# Patient Record
Sex: MALE | Race: WHITE | Employment: OTHER | URBAN - METROPOLITAN AREA
[De-identification: names, ages, dates, MRNs, and addresses within clinical notes are randomized per-mention and may not be internally consistent; named-entity substitution may affect disease eponyms.]

---

## 2017-01-16 ENCOUNTER — TELEPHONE (OUTPATIENT)
Dept: FAMILY MEDICINE | Facility: CLINIC | Age: 67
End: 2017-01-16

## 2017-02-24 ENCOUNTER — ANESTHESIA EVENT (OUTPATIENT)
Dept: GASTROENTEROLOGY | Facility: CLINIC | Age: 67
End: 2017-02-24
Payer: COMMERCIAL

## 2017-02-27 ENCOUNTER — ANESTHESIA (OUTPATIENT)
Dept: GASTROENTEROLOGY | Facility: CLINIC | Age: 67
End: 2017-02-27
Payer: COMMERCIAL

## 2017-02-27 PROCEDURE — 25000125 ZZHC RX 250: Performed by: NURSE ANESTHETIST, CERTIFIED REGISTERED

## 2017-02-27 PROCEDURE — 25000128 H RX IP 250 OP 636: Performed by: NURSE ANESTHETIST, CERTIFIED REGISTERED

## 2017-02-27 PROCEDURE — 25800025 ZZH RX 258: Performed by: NURSE ANESTHETIST, CERTIFIED REGISTERED

## 2017-02-27 RX ORDER — LIDOCAINE HYDROCHLORIDE 20 MG/ML
INJECTION, SOLUTION INFILTRATION; PERINEURAL PRN
Status: DISCONTINUED | OUTPATIENT
Start: 2017-02-27 | End: 2017-02-27

## 2017-02-27 RX ORDER — PROPOFOL 10 MG/ML
INJECTION, EMULSION INTRAVENOUS PRN
Status: DISCONTINUED | OUTPATIENT
Start: 2017-02-27 | End: 2017-02-27

## 2017-02-27 RX ORDER — EPHEDRINE SULFATE 50 MG/ML
INJECTION, SOLUTION INTRAMUSCULAR; INTRAVENOUS; SUBCUTANEOUS PRN
Status: DISCONTINUED | OUTPATIENT
Start: 2017-02-27 | End: 2017-02-27

## 2017-02-27 RX ORDER — PROPOFOL 10 MG/ML
INJECTION, EMULSION INTRAVENOUS CONTINUOUS PRN
Status: DISCONTINUED | OUTPATIENT
Start: 2017-02-27 | End: 2017-02-27

## 2017-02-27 RX ADMIN — MIDAZOLAM HYDROCHLORIDE 2 MG: 1 INJECTION, SOLUTION INTRAMUSCULAR; INTRAVENOUS at 10:57

## 2017-02-27 RX ADMIN — PROPOFOL 140 MCG/KG/MIN: 10 INJECTION, EMULSION INTRAVENOUS at 10:57

## 2017-02-27 RX ADMIN — LIDOCAINE HYDROCHLORIDE 20 MG: 20 INJECTION, SOLUTION INFILTRATION; PERINEURAL at 10:59

## 2017-02-27 RX ADMIN — PROPOFOL 30 MG: 10 INJECTION, EMULSION INTRAVENOUS at 11:02

## 2017-02-27 RX ADMIN — Medication 5 MG: at 11:09

## 2017-02-27 RX ADMIN — SODIUM CHLORIDE, POTASSIUM CHLORIDE, SODIUM LACTATE AND CALCIUM CHLORIDE: 600; 310; 30; 20 INJECTION, SOLUTION INTRAVENOUS at 10:57

## 2017-02-27 ASSESSMENT — COPD QUESTIONNAIRES: COPD: 1

## 2017-02-27 NOTE — ANESTHESIA POSTPROCEDURE EVALUATION
Patient: Terrence Montoya    Procedure(s):  colonoscopy with polypectomies by biopsy forceps and by snare - Wound Class: II-Clean Contaminated   - Wound Class: II-Clean Contaminated    Diagnosis:screening  Diagnosis Additional Information: No value filed.    Anesthesia Type:  MAC    Note:  Anesthesia Post Evaluation    Patient location during evaluation: Phase 2  Patient participation: Able to fully participate in evaluation  Level of consciousness: awake and alert  Pain management: adequate  Airway patency: patent  Cardiovascular status: acceptable  Respiratory status: acceptable  Hydration status: acceptable  PONV: none             Last vitals:  Vitals:    02/27/17 1137 02/27/17 1152 02/27/17 1205   BP: 103/68 100/64 127/84   Pulse:   82   Resp: 16 16 16   Temp:      SpO2: 97% 97% 97%         Electronically Signed By: REKHA Mccall CRNA  February 27, 2017  2:48 PM

## 2017-02-27 NOTE — ANESTHESIA POSTPROCEDURE EVALUATION
Patient: Terrence Montoya    Procedure(s):  colonoscopy with polypectomies by biopsy forceps and by snare - Wound Class: II-Clean Contaminated   - Wound Class: II-Clean Contaminated    Diagnosis:screening  Diagnosis Additional Information: No value filed.    Anesthesia Type:  MAC    Note:  Anesthesia Post Evaluation    Patient location during evaluation: Bedside  Patient participation: Able to fully participate in evaluation  Level of consciousness: awake  Pain management: adequate  Airway patency: patent  Cardiovascular status: acceptable  Respiratory status: acceptable  Hydration status: acceptable  PONV: none             Last vitals:  Vitals:    02/27/17 0956 02/27/17 1137 02/27/17 1152   BP: (!) 148/94 103/68 100/64   Resp: 16 16 16   Temp: 97.5  F (36.4  C)     SpO2: 96% 97% 97%         Electronically Signed By: REKHA Mccall CRNA  February 27, 2017  12:04 PM

## 2017-02-27 NOTE — ANESTHESIA CARE TRANSFER NOTE
Patient: Terrence Montoay    Procedure(s):  colonoscopy with polypectomies by biopsy forceps and by snare - Wound Class: II-Clean Contaminated   - Wound Class: II-Clean Contaminated    Diagnosis: screening  Diagnosis Additional Information: No value filed.    Anesthesia Type:   MAC     Note:  Airway :Face Mask  Patient transferred to:Phase II        Vitals: (Last set prior to Anesthesia Care Transfer)    CRNA VITALS  2/27/2017 1110 - 2/27/2017 1140      2/27/2017             Temp: (!)  69.1  F (20.6  C)                Electronically Signed By: REKHA Mccall CRNA  February 27, 2017  11:40 AM

## 2017-02-27 NOTE — ANESTHESIA POSTPROCEDURE EVALUATION
Patient: Terrence Montoya    Procedure(s):  colonoscopy with polypectomies by biopsy forceps and by snare - Wound Class: II-Clean Contaminated   - Wound Class: II-Clean Contaminated    Diagnosis:screening  Diagnosis Additional Information: No value filed.    Anesthesia Type:  MAC    Note:  Anesthesia Post Evaluation    Last vitals:  Vitals:    02/27/17 0956 02/27/17 1137 02/27/17 1152   BP: (!) 148/94 103/68 100/64   Resp: 16 16 16   Temp: 97.5  F (36.4  C)     SpO2: 96% 97% 97%         Electronically Signed By: REKHA Ramsey CRNA  February 27, 2017  12:08 PM

## 2017-02-27 NOTE — ANESTHESIA POSTPROCEDURE EVALUATION
Patient: Terrence Montoya    Procedure(s):  colonoscopy with polypectomies by biopsy forceps and by snare - Wound Class: II-Clean Contaminated   - Wound Class: II-Clean Contaminated    Diagnosis:screening  Diagnosis Additional Information: No value filed.    Anesthesia Type:  MAC    Note:  Anesthesia Post Evaluation    Patient location during evaluation: Phase 2  Patient participation: Able to fully participate in evaluation  Level of consciousness: awake and alert  Pain management: adequate  Airway patency: patent  Cardiovascular status: acceptable  Respiratory status: acceptable  Hydration status: acceptable  PONV: none             Last vitals:  Vitals:    02/27/17 0956 02/27/17 1137 02/27/17 1152   BP: (!) 148/94 103/68 100/64   Resp: 16 16 16   Temp: 97.5  F (36.4  C)     SpO2: 96% 97% 97%         Electronically Signed By: REKHA Mccall CRNA  February 27, 2017  12:05 PM

## 2017-02-27 NOTE — ANESTHESIA POSTPROCEDURE EVALUATION
Patient: Terrence Montoya    Procedure(s):  colonoscopy with polypectomies by biopsy forceps and by snare - Wound Class: II-Clean Contaminated   - Wound Class: II-Clean Contaminated    Diagnosis:screening  Diagnosis Additional Information: No value filed.    Anesthesia Type:  MAC    Note:  Anesthesia Post Evaluation    Patient location during evaluation: Phase 2  Patient participation: Able to fully participate in evaluation  Level of consciousness: awake and alert  Pain management: adequate  Airway patency: patent  Cardiovascular status: acceptable  Respiratory status: acceptable  Hydration status: acceptable  PONV: none             Last vitals:  Vitals:    02/27/17 1137 02/27/17 1152 02/27/17 1205   BP: 103/68 100/64 127/84   Pulse:   82   Resp: 16 16 16   Temp:      SpO2: 97% 97% 97%         Electronically Signed By: REKHA Mccall CRNA  February 27, 2017  12:46 PM

## 2017-02-27 NOTE — ANESTHESIA PREPROCEDURE EVALUATION
Anesthesia Evaluation     . Pt has had prior anesthetic. Type: General    No history of anesthetic complications     ROS/MED HX    ENT/Pulmonary:  - neg pulmonary ROS   (+)COPD, , . Other pulmonary disease (denies lunf problems) .    Neurologic:  - neg neurologic ROS     Cardiovascular:  - neg cardiovascular ROS   (+) ----. : . . . :. . No previous cardiac testing       METS/Exercise Tolerance:     Hematologic:  - neg hematologic  ROS       Musculoskeletal:  - neg musculoskeletal ROS       GI/Hepatic:  - neg GI/hepatic ROS       Renal/Genitourinary:  - ROS Renal section negative       Endo:  - neg endo ROS       Psychiatric:  - neg psychiatric ROS       Infectious Disease:  - neg infectious disease ROS       Malignancy:         Other:               Physical Exam  Normal systems: pulmonary and dental    Airway   Mallampati: I  TM distance: >3 FB  Neck ROM: full    Dental     Cardiovascular   Rhythm and rate: regular and normal      Pulmonary    breath sounds clear to auscultation                    Anesthesia Plan      History & Physical Review  History and physical reviewed and following examination; no interval change.    ASA Status:  2 .    NPO Status:  > 8 hours    Plan for MAC with Propofol induction. Maintenance will be TIVA.    PONV prophylaxis:  Ondansetron (or other 5HT-3) and Dexamethasone or Solumedrol       Postoperative Care  Postoperative pain management:  Oral pain medications.      Consents  Anesthetic plan, risks, benefits and alternatives discussed with:  Patient.  Use of blood products discussed: Yes.   Use of blood products discussed with Patient.  .                          .

## 2017-05-30 ENCOUNTER — OFFICE VISIT (OUTPATIENT)
Dept: URGENT CARE | Facility: RETAIL CLINIC | Age: 67
End: 2017-05-30
Payer: COMMERCIAL

## 2017-05-30 VITALS
HEART RATE: 79 BPM | OXYGEN SATURATION: 96 % | DIASTOLIC BLOOD PRESSURE: 94 MMHG | SYSTOLIC BLOOD PRESSURE: 143 MMHG | TEMPERATURE: 99.3 F

## 2017-05-30 DIAGNOSIS — J02.0 ACUTE STREPTOCOCCAL PHARYNGITIS: Primary | ICD-10-CM

## 2017-05-30 DIAGNOSIS — R05.9 COUGH: ICD-10-CM

## 2017-05-30 DIAGNOSIS — J02.9 ACUTE PHARYNGITIS, UNSPECIFIED ETIOLOGY: ICD-10-CM

## 2017-05-30 DIAGNOSIS — J20.9 ACUTE BRONCHITIS WITH COEXISTING CONDITION REQUIRING PROPHYLACTIC TREATMENT: ICD-10-CM

## 2017-05-30 LAB — S PYO AG THROAT QL IA.RAPID: ABNORMAL

## 2017-05-30 PROCEDURE — 99213 OFFICE O/P EST LOW 20 MIN: CPT | Performed by: PHYSICIAN ASSISTANT

## 2017-05-30 PROCEDURE — 87880 STREP A ASSAY W/OPTIC: CPT | Mod: QW | Performed by: PHYSICIAN ASSISTANT

## 2017-05-30 RX ORDER — ALBUTEROL SULFATE 90 UG/1
2 AEROSOL, METERED RESPIRATORY (INHALATION) EVERY 6 HOURS PRN
Qty: 1 INHALER | Refills: 0 | Status: SHIPPED | OUTPATIENT
Start: 2017-05-30 | End: 2018-12-14

## 2017-05-30 RX ORDER — AZITHROMYCIN 250 MG/1
TABLET, FILM COATED ORAL
Qty: 6 TABLET | Refills: 0 | Status: SHIPPED | OUTPATIENT
Start: 2017-05-30 | End: 2018-12-14

## 2017-05-30 NOTE — MR AVS SNAPSHOT
After Visit Summary   5/30/2017    Terrence Montoya    MRN: [id number]           Patient Information     Date Of Birth          1950        Visit Information        Provider Department      5/30/2017 10:40 AM Julienne Chavez PA-C [facility]        Today's Diagnoses     Acute pharyngitis, unspecified etiology    -  1    Cough        Elevated blood pressure        Acute streptococcal pharyngitis        Acute bronchitis with coexisting condition requiring prophylactic treatment          Care Instructions      Pharyngitis: Strep (Confirmed)     You have had a positive test for strep throat. Strep throat is a contagious illness. It is spread by coughing, kissing or by touching others after touching your mouth or nose. Symptoms include throat pain which is worse with swallowing, aching all over, headache and fever. It is treated with antibiotic medication. This should help you start to feel better within 1-2 days.  Home care    Rest at home. Drink plenty of fluids to avoid dehydration.    No work or school for the first 2 days of taking the antibiotics. After this time, you will not be contagious. You can then return to school or work if you are feeling better.     The antibiotic medication must be taken for the full 10 days, even if you feel better. This is very important to ensure the infection is treated. It is also important to prevent drug-resistent organisms from developing. If you were given an antibiotic shot, no more antibiotics are needed.    You may use acetaminophen (Tylenol) or ibuprofen (Motrin, Advil) to control pain or fever, unless another medicine was prescribed for this. (NOTE: If you have chronic liver or kidney disease or ever had a stomach ulcer or GI bleeding, talk with your doctor before using these medicines.)    Throat lozenges or sprays (such as Chloraseptic) help reduce pain. Gargling with warm salt water will also reduce throat pain. Dissolve 1/2  teaspoon of salt in 1 glass of warm water. This may be useful just before meals.     Soft foods are okay. Avoid salty or spicy foods.  Follow-up care  Follow up with your healthcare provider or our staff if you are not improving over the next week.  When to seek medical advice  Call your healthcare provider right away if any of these occur:    Fever as directed by your doctor     New or worsening ear pain, sinus pain, or headache    Painful lumps in the back of neck    Stiff neck    Lymph nodes are getting larger or becoming soft in the middle    Inability to swallow liquids, excessive drooling, or inability to open mouth wide due to throat pain    Signs of dehydration (very dark urine or no urine, sunken eyes, dizziness)    Trouble breathing or noisy breathing    Muffled voice    New rash    0640-5247 [employer]. [address] [place] [address]. All rights reserved. This information is not intended as a substitute for professional medical care. Always follow your healthcare professional's instructions.    ...................................    Your blood pressure is elevated at today's visit.  You should follow up with your primary provider regarding possible hypertension if your recheck are greater than 140/90.  Check your blood pressure several times in the next week or so. You can do this at local pharmacies, grocery stores or with the float nurse at the [facility]. Record your readings and take them with you to your appointment.  Goal BP <140/90  Do not take decongestants - they can raise your BP.  If you have chest pain, unusual headaches, vision changes or any sign or symptoms of stroke seek prompt medical attention.    BP (!) 143/94 (BP Location: Right arm, Patient Position: Chair, Cuff Size: Adult Large)  Pulse 79  Temp 99.3  F (37.4  C) (Tympanic)  SpO2 96%    Please FOLLOW UP at primary care clinic if not improving, new symptoms, worse or this does not resolve.  [place]  [facility]  [phone]                Follow-ups after your visit        Who to contact     You can reach your care team any time of the day by calling [phone].  Notification of test results:  If you have an abnormal lab result, we will notify you by phone as soon as possible.         Additional Information About Your Visit        MyChart Information     [employer] gives you secure access to your electronic health record. If you see a primary care provider, you can also send messages to your care team and make appointments. If you have questions, please call your primary care clinic.  If you do not have a primary care provider, please call [phone] and they will assist you.        Care EveryWhere ID     This is your Care EveryWhere ID. This could be used by other organizations to access your [facility] medical records  [id number]        Your Vitals Were     Pulse Temperature Pulse Oximetry             79 99.3  F (37.4  C) (Tympanic) 96%          Blood Pressure from Last 3 Encounters:   05/30/17 (!) 143/94   02/27/17 127/84   11/02/16 (!) 150/92    Weight from Last 3 Encounters:   11/02/16 184 lb 6.4 oz (83.6 kg)   05/16/16 189 lb (85.7 kg)   04/27/15 184 lb (83.5 kg)              We Performed the Following     RAPID STREP SCREEN          Today's Medication Changes          These changes are accurate as of: 5/30/17 11:13 AM.  If you have any questions, ask your nurse or doctor.               Start taking these medicines.        Dose/Directions    azithromycin 250 MG tablet   Commonly known as:  ZITHROMAX   Used for:  Acute bronchitis with coexisting condition requiring prophylactic treatment, Acute streptococcal pharyngitis   Started by:  Julienne Chavez PA-C        Two tablets first day, then one tablet daily for four days.   Quantity:  6 tablet   Refills:  0            Where to get your medicines      These medications were sent to [facility] [address] [place] [address] - [address]  [address],  [place] [address]     Phone:  [phone]     azithromycin 250 MG tablet                Primary Care Provider Office Phone # Fax #    Jem Casper,  [phone]       [address] [place] [address]        Thank you!     Thank you for choosing [facility]  for your care. Our goal is always to provide you with excellent care. Hearing back from our patients is one way we can continue to improve our services. Please take a few minutes to complete the written survey that you may receive in the mail after your visit with us. Thank you!             Your Updated Medication List - Protect others around you: Learn how to safely use, store and throw away your medicines at www.disposemymeds.org.          This list is accurate as of: 5/30/17 11:13 AM.  Always use your most recent med list.                   Brand Name Dispense Instructions for use    azithromycin 250 MG tablet    ZITHROMAX    6 tablet    Two tablets first day, then one tablet daily for four days.

## 2017-05-30 NOTE — NURSING NOTE
"Chief Complaint   Patient presents with     Pharyngitis     x 5 days     Cough      productive cough x 8 days        Initial BP (!) 143/94 (BP Location: Right arm, Patient Position: Chair, Cuff Size: Adult Large)  Pulse 79  Temp 99.3  F (37.4  C) (Tympanic)  SpO2 96% Estimated body mass index is 30.69 kg/(m^2) as calculated from the following:    Height as of 11/2/16: 5' 5\" (1.651 m).    Weight as of 11/2/16: 184 lb 6.4 oz (83.6 kg).  Medication Reconciliation: complete     Jessica Sundet      "

## 2017-05-30 NOTE — PATIENT INSTRUCTIONS
Pharyngitis: Strep (Confirmed)     You have had a positive test for strep throat. Strep throat is a contagious illness. It is spread by coughing, kissing or by touching others after touching your mouth or nose. Symptoms include throat pain which is worse with swallowing, aching all over, headache and fever. It is treated with antibiotic medication. This should help you start to feel better within 1-2 days.  Home care    Rest at home. Drink plenty of fluids to avoid dehydration.    No work or school for the first 2 days of taking the antibiotics. After this time, you will not be contagious. You can then return to school or work if you are feeling better.     The antibiotic medication must be taken for the full 10 days, even if you feel better. This is very important to ensure the infection is treated. It is also important to prevent drug-resistent organisms from developing. If you were given an antibiotic shot, no more antibiotics are needed.    You may use acetaminophen (Tylenol) or ibuprofen (Motrin, Advil) to control pain or fever, unless another medicine was prescribed for this. (NOTE: If you have chronic liver or kidney disease or ever had a stomach ulcer or GI bleeding, talk with your doctor before using these medicines.)    Throat lozenges or sprays (such as Chloraseptic) help reduce pain. Gargling with warm salt water will also reduce throat pain. Dissolve 1/2 teaspoon of salt in 1 glass of warm water. This may be useful just before meals.     Soft foods are okay. Avoid salty or spicy foods.  Follow-up care  Follow up with your healthcare provider or our staff if you are not improving over the next week.  When to seek medical advice  Call your healthcare provider right away if any of these occur:    Fever as directed by your doctor     New or worsening ear pain, sinus pain, or headache    Painful lumps in the back of neck    Stiff neck    Lymph nodes are getting larger or becoming soft in the  middle    Inability to swallow liquids, excessive drooling, or inability to open mouth wide due to throat pain    Signs of dehydration (very dark urine or no urine, sunken eyes, dizziness)    Trouble breathing or noisy breathing    Muffled voice    New rash    7038-8682 [employer]. [address] [place] [address]. All rights reserved. This information is not intended as a substitute for professional medical care. Always follow your healthcare professional's instructions.    ...................................    Your blood pressure is elevated at today's visit.  You should follow up with your primary provider regarding possible hypertension if your recheck are greater than 140/90.  Check your blood pressure several times in the next week or so. You can do this at local pharmacies, grocery stores or with the float nurse at the [facility]. Record your readings and take them with you to your appointment.  Goal BP <140/90  Do not take decongestants - they can raise your BP.  If you have chest pain, unusual headaches, vision changes or any sign or symptoms of stroke seek prompt medical attention.    BP (!) 143/94 (BP Location: Right arm, Patient Position: Chair, Cuff Size: Adult Large)  Pulse 79  Temp 99.3  F (37.4  C) (Tympanic)  SpO2 96%    Please FOLLOW UP at primary care clinic if not improving, new symptoms, worse or this does not resolve.  [facility]  [phone]

## 2017-05-30 NOTE — PROGRESS NOTES
"  Chief Complaint   Patient presents with     Pharyngitis     x 5 days     Cough      productive cough x 8 days          SUBJECTIVE:   Pt. presenting to [facility] -  with a chief complaint of nasal congestion, ST off and on and some cough -occ productive. No SOB or chest pain.  Hx of asthma or COPD..  Onset of symptoms gradual  Course of illness is worsening.    Severity moderate  Current and Associated symptoms: nasal congestion, cough  and sore throat  Treatment measures tried include Fluids, OTC meds and Rest.  Predisposing factors include None.  Last antibiotic 5/2016   Smoker no    Past Medical History:   Diagnosis Date     Brain tumor (benign) (H)      Emphysema     \"runners lung\"     Past Surgical History:   Procedure Laterality Date     COLONOSCOPY N/A 2/27/2017    Procedure: COMBINED COLONOSCOPY, SINGLE OR MULTIPLE BIOPSY/POLYPECTOMY BY BIOPSY;  Surgeon: Armani Jacques MD;  Location:  GI     Patient Active Problem List   Diagnosis     Elevated blood pressure     No current outpatient prescriptions on file.     No current facility-administered medications for this visit.      OBJECTIVE:  BP (!) 143/94 (BP Location: Right arm, Patient Position: Chair, Cuff Size: Adult Large)  Pulse 79  Temp 99.3  F (37.4  C) (Tympanic)  SpO2 96%    GENERAL APPEARANCE: cooperative, alert and no distress. Appears well hydrated.  EYES: conjunctiva clear  HENT: Rt ear canal  clear and TM normal   Lt ear canal clear and TM normal   Nose mod congestion. clear discharge  Mouth without ulcers or lesions. mod erythema. no exudate.   NECK: supple, few small shoddy NT ant nodes. No  posterior nodes.  RESP: lungs no rales but some scattered rhonchi and wheezes. Breathing easily.  CV: regular rates and rhythm  ABDOMEN:  soft, nontender, no HSM or masses and bowel sounds normal   SKIN: no suspicious lesions or rashes  no tenderness to palpate over  sinus areas.    Rapid strep pos    ASSESSMENT:     Acute " pharyngitis, unspecified etiology  Cough  Elevated blood pressure  Acute streptococcal pharyngitis  Acute bronchitis with coexisting condition requiring prophylactic treatment      PLAN:  Symptomatic measures   Prescriptions as below. Discussed indications, dosing, side affects and adverse reactions of medications with  Patient - Z pack (no history of arrhythmias)  And Albuterol  Eat yogurt daily or take a probiotic supplement when on antibiotics.  OTC cough suppressant/expectorant discussed  No decongestants - needs BP FU  Salt water gargles -throat lozenges or honey/lemon tea if soothing   saline nasal spray for  nasal congestion   Cool mist vaporizer.   Stay in clean air environment.  > rest.  > fluids.  Contagiousness and hygiene discussed.  Fever and pain  control measures discussed.   If unable to swallow or any breathing difficulty to go to ED   Declines letter for work  AVS given and discussed:  Patient Instructions     Pharyngitis: Strep (Confirmed)     You have had a positive test for strep throat. Strep throat is a contagious illness. It is spread by coughing, kissing or by touching others after touching your mouth or nose. Symptoms include throat pain which is worse with swallowing, aching all over, headache and fever. It is treated with antibiotic medication. This should help you start to feel better within 1-2 days.  Home care    Rest at home. Drink plenty of fluids to avoid dehydration.    No work or school for the first 2 days of taking the antibiotics. After this time, you will not be contagious. You can then return to school or work if you are feeling better.     The antibiotic medication must be taken for the full 10 days, even if you feel better. This is very important to ensure the infection is treated. It is also important to prevent drug-resistent organisms from developing. If you were given an antibiotic shot, no more antibiotics are needed.    You may use acetaminophen (Tylenol) or ibuprofen  (Motrin, Advil) to control pain or fever, unless another medicine was prescribed for this. (NOTE: If you have chronic liver or kidney disease or ever had a stomach ulcer or GI bleeding, talk with your doctor before using these medicines.)    Throat lozenges or sprays (such as Chloraseptic) help reduce pain. Gargling with warm salt water will also reduce throat pain. Dissolve 1/2 teaspoon of salt in 1 glass of warm water. This may be useful just before meals.     Soft foods are okay. Avoid salty or spicy foods.  Follow-up care  Follow up with your healthcare provider or our staff if you are not improving over the next week.  When to seek medical advice  Call your healthcare provider right away if any of these occur:    Fever as directed by your doctor     New or worsening ear pain, sinus pain, or headache    Painful lumps in the back of neck    Stiff neck    Lymph nodes are getting larger or becoming soft in the middle    Inability to swallow liquids, excessive drooling, or inability to open mouth wide due to throat pain    Signs of dehydration (very dark urine or no urine, sunken eyes, dizziness)    Trouble breathing or noisy breathing    Muffled voice    New rash    9863-6420 [employer]. [address] [place] [address]. All rights reserved. This information is not intended as a substitute for professional medical care. Always follow your healthcare professional's instructions.    ...................................    Your blood pressure is elevated at today's visit.  You should follow up with your primary provider regarding possible hypertension if your recheck are greater than 140/90.  Check your blood pressure several times in the next week or so. You can do this at local pharmacies, grocery stores or with the float nurse at the [facility]. Record your readings and take them with you to your appointment.  Goal BP <140/90  Do not take decongestants - they can raise your BP.  If you  have chest pain, unusual headaches, vision changes or any sign or symptoms of stroke seek prompt medical attention.    BP (!) 143/94 (BP Location: Right arm, Patient Position: Chair, Cuff Size: Adult Large)  Pulse 79  Temp 99.3  F (37.4  C) (Tympanic)  SpO2 96%    Please FOLLOW UP at primary care clinic if not improving, new symptoms, worse or this does not resolve.  [facility]  [phone]        Pt is comfortable with this plan.  Electronically signed,  MARTÍNEZ Chavez, PAC

## 2018-12-14 ENCOUNTER — OFFICE VISIT (OUTPATIENT)
Dept: FAMILY MEDICINE | Facility: OTHER | Age: 68
End: 2018-12-14
Payer: COMMERCIAL

## 2018-12-14 DIAGNOSIS — Z00.00 ROUTINE HISTORY AND PHYSICAL EXAMINATION OF ADULT: Primary | ICD-10-CM

## 2018-12-14 LAB
ALBUMIN SERPL-MCNC: 3.9 G/DL (ref 3.4–5)
ALP SERPL-CCNC: 92 U/L (ref 40–150)
ALT SERPL W P-5'-P-CCNC: 42 U/L (ref 0–70)
ANION GAP SERPL CALCULATED.3IONS-SCNC: 6 MMOL/L (ref 3–14)
AST SERPL W P-5'-P-CCNC: 16 U/L (ref 0–45)
BILIRUB SERPL-MCNC: 0.6 MG/DL (ref 0.2–1.3)
BUN SERPL-MCNC: 26 MG/DL (ref 7–30)
CALCIUM SERPL-MCNC: 8.7 MG/DL (ref 8.5–10.1)
CHLORIDE SERPL-SCNC: 109 MMOL/L (ref 94–109)
CO2 SERPL-SCNC: 25 MMOL/L (ref 20–32)
CREAT SERPL-MCNC: 1.26 MG/DL (ref 0.66–1.25)
ERYTHROCYTE [DISTWIDTH] IN BLOOD BY AUTOMATED COUNT: 13.7 % (ref 10–15)
GFR SERPL CREATININE-BSD FRML MDRD: 57 ML/MIN/1.7M2
GLUCOSE SERPL-MCNC: 175 MG/DL (ref 70–99)
HCT VFR BLD AUTO: 44.9 % (ref 40–53)
HGB BLD-MCNC: 14.8 G/DL (ref 13.3–17.7)
LDLC SERPL DIRECT ASSAY-MCNC: 136 MG/DL
MCH RBC QN AUTO: 30.1 PG (ref 26.5–33)
MCHC RBC AUTO-ENTMCNC: 33 G/DL (ref 31.5–36.5)
MCV RBC AUTO: 91 FL (ref 78–100)
PLATELET # BLD AUTO: 171 10E9/L (ref 150–450)
POTASSIUM SERPL-SCNC: 4.3 MMOL/L (ref 3.4–5.3)
PROT SERPL-MCNC: 7.6 G/DL (ref 6.8–8.8)
PSA SERPL-ACNC: 4.8 UG/L (ref 0–4)
RBC # BLD AUTO: 4.91 10E12/L (ref 4.4–5.9)
SODIUM SERPL-SCNC: 140 MMOL/L (ref 133–144)
WBC # BLD AUTO: 6 10E9/L (ref 4–11)

## 2018-12-14 PROCEDURE — 36415 COLL VENOUS BLD VENIPUNCTURE: CPT | Performed by: INTERNAL MEDICINE

## 2018-12-14 PROCEDURE — 80053 COMPREHEN METABOLIC PANEL: CPT | Performed by: INTERNAL MEDICINE

## 2018-12-14 PROCEDURE — 99397 PER PM REEVAL EST PAT 65+ YR: CPT | Performed by: INTERNAL MEDICINE

## 2018-12-14 PROCEDURE — 83721 ASSAY OF BLOOD LIPOPROTEIN: CPT | Performed by: INTERNAL MEDICINE

## 2018-12-14 PROCEDURE — G0103 PSA SCREENING: HCPCS | Performed by: INTERNAL MEDICINE

## 2018-12-14 PROCEDURE — 85027 COMPLETE CBC AUTOMATED: CPT | Performed by: INTERNAL MEDICINE

## 2018-12-14 ASSESSMENT — ENCOUNTER SYMPTOMS
PALPITATIONS: 0
NERVOUS/ANXIOUS: 0
CONSTIPATION: 0
NAUSEA: 0
FREQUENCY: 0
WEAKNESS: 0
DIARRHEA: 0
DIZZINESS: 0
DYSURIA: 0
ARTHRALGIAS: 0
COUGH: 1
SHORTNESS OF BREATH: 0
PARESTHESIAS: 0
ABDOMINAL PAIN: 0
SORE THROAT: 0
MYALGIAS: 0
CHILLS: 0
FEVER: 0
HEMATOCHEZIA: 0
EYE PAIN: 0
JOINT SWELLING: 0
HEARTBURN: 0
HEMATURIA: 0

## 2018-12-14 ASSESSMENT — ACTIVITIES OF DAILY LIVING (ADL): CURRENT_FUNCTION: NO ASSISTANCE NEEDED

## 2018-12-14 ASSESSMENT — PAIN SCALES - GENERAL: PAINLEVEL: NO PAIN (0)

## 2018-12-14 NOTE — PROGRESS NOTES
"SUBJECTIVE:   Terrence Montoya is a 68 year old male who presents for Preventive Visit.      Are you in the first 12 months of your Medicare coverage?   NOT USING MEDICARE INSURANCE, WANTS TO BE BILLED TO PRIMARY     Annual Wellness Visit     In general, how would you rate your overall health?  Good    Frequency of exercise:  1 day/week    Do you usually eat at least 4 servings of fruit and vegetables a day, include whole grains    & fiber and avoid regularly eating high fat or \"junk\" foods?  No    Taking medications regularly:  Yes    Medication side effects:  None    Ability to successfully perform activities of daily living:  No assistance needed    Home Safety:  Lack of grab bars in the bathroom and no safety concerns identified    Hearing Impairment:  Difficulty following a conversation in a noisy restaurant or crowded room and difficulty understanding soft or whispered speech    In the past 6 months, have you been bothered by leaking of urine?  No    In general, how would you rate your overall mental or emotional health?  Good    PHQ-2 Total Score: 0    Additional concerns today:  No    Do you feel safe in your environment? No    Do you have a Health Care Directive? No: Advance care planning reviewed with patient; information given to patient to review.      Fall risk  Fallen 2 or more times in the past year?: No  Any fall with injury in the past year?: No    Cognitive Screening   1) Repeat 3 items (Leader, Season, Table)    2) Clock draw: NORMAL  3) 3 item recall: Recalls 2 objects   Results: NORMAL clock, 1-2 items recalled: COGNITIVE IMPAIRMENT LESS LIKELY    Mini-CogTM Copyright CARLA Elias. Licensed by the author for use in [facility]; reprinted with permission (sirisha@.[facility]). All rights reserved.      Do you have sleep apnea, excessive snoring or daytime drowsiness?: no    Reviewed and updated as needed this visit by clinical staff  Tobacco  Allergies  Meds  Med Hx  Surg Hx  Fam Hx  Soc Hx "        Reviewed and updated as needed this visit by Provider        Social History     Tobacco Use     Smoking status: Never Smoker     Smokeless tobacco: Never Used   Substance Use Topics     Alcohol use: No     Alcohol/week: 0.0 oz       Alcohol Use 12/14/2018   If you drink alcohol do you typically have greater than 3 drinks per day OR greater than 7 drinks per week? No   No flowsheet data found.            Current providers sharing in care for this patient include:   Patient Care Team:  Jem Casper DO as PCP - General (Internal Medicine)    The following health maintenance items are reviewed in Epic and correct as of today:  Health Maintenance   Topic Date Due     ADVANCE DIRECTIVE PLANNING Q5 YRS  05/15/2007     ZOSTER IMMUNIZATION (2 of 3) 01/03/2017     FALL RISK ASSESSMENT  05/15/2017     AORTIC ANEURYSM SCREENING (SYSTEM ASSIGNED)  05/15/2017     PHQ-2 Q1 YR  11/02/2017     INFLUENZA VACCINE (1) 09/01/2018     LIPID SCREEN Q5 YR MALE (SYSTEM ASSIGNED)  11/02/2021     DTAP/TDAP/TD IMMUNIZATION (2 - Td) 11/30/2021     COLON CANCER SCREEN (SYSTEM ASSIGNED)  02/27/2027     HEPATITIS C SCREENING  Completed     IPV IMMUNIZATION  Aged Out     MENINGITIS IMMUNIZATION  Aged Out     Labs reviewed in EPIC  BP Readings from Last 3 Encounters:   12/14/18 (!) 181/96   05/30/17 (!) 143/94   02/27/17 127/84    Wt Readings from Last 3 Encounters:   12/14/18 87.3 kg (192 lb 8 oz)   11/02/16 83.6 kg (184 lb 6.4 oz)   05/16/16 85.7 kg (189 lb)                  Patient Active Problem List   Diagnosis     Elevated blood pressure     Past Surgical History:   Procedure Laterality Date     COLONOSCOPY N/A 2/27/2017    Procedure: COMBINED COLONOSCOPY, SINGLE OR MULTIPLE BIOPSY/POLYPECTOMY BY BIOPSY;  Surgeon: Armani Jacques MD;  Location:  GI       Social History     Tobacco Use     Smoking status: Never Smoker     Smokeless tobacco: Never Used   Substance Use Topics     Alcohol use: No     Alcohol/week:  "0.0 oz     Family History   Problem Relation Age of Onset     Other Cancer Mother         Lung     Other Cancer Father         Brain     Prostate Cancer Paternal Grandfather      Other Cancer Brother         Testicular     Anesthesia Reaction No family hx of          No current outpatient medications on file.     Allergies   Allergen Reactions     Nkda [No Known Drug Allergies]      Pneumonia Vaccine: Up-to-date    Review of Systems  CONSTITUTIONAL: NEGATIVE for fever, chills, change in weight  INTEGUMENTARY/SKIN: NEGATIVE for worrisome rashes, moles or lesions  EYES: NEGATIVE for vision changes or irritation  ENT/MOUTH: NEGATIVE for ear, mouth and throat problems  RESP: NEGATIVE for significant cough or SOB  BREAST: NEGATIVE for masses, tenderness or discharge  CV: NEGATIVE for chest pain, palpitations or peripheral edema  GI: NEGATIVE for nausea, abdominal pain, heartburn, or change in bowel habits  : NEGATIVE for frequency, dysuria, or hematuria  MUSCULOSKELETAL: NEGATIVE for significant arthralgias or myalgia  NEURO: NEGATIVE for weakness, dizziness or paresthesias  ENDOCRINE: NEGATIVE for temperature intolerance, skin/hair changes  HEME: NEGATIVE for bleeding problems  PSYCHIATRIC: NEGATIVE for changes in mood or affect    OBJECTIVE:   /88 (BP Location: Right arm, Patient Position: Sitting, Cuff Size: Adult Regular)   Pulse 72   Temp 97.8  F (36.6  C) (Temporal)   Resp 18   Wt 87.3 kg (192 lb 8 oz)   SpO2 97%   BMI 32.03 kg/m   Estimated body mass index is 32.03 kg/m  as calculated from the following:    Height as of 11/2/16: 1.651 m (5' 5\").    Weight as of this encounter: 87.3 kg (192 lb 8 oz).  Physical Exam  GENERAL: healthy, alert and no distress  EYES: Eyes grossly normal to inspection, PERRL and conjunctivae and sclerae normal  HENT: ear canals and TM's normal, nose and mouth without ulcers or lesions  NECK: no adenopathy, no asymmetry, masses, or scars and thyroid normal to " palpation  RESP: lungs clear to auscultation - no rales, rhonchi or wheezes  CV: regular rate and rhythm, normal S1 S2, no S3 or S4, no murmur, click or rub, no peripheral edema and peripheral pulses strong  ABDOMEN: soft, nontender, no hepatosplenomegaly, no masses and bowel sounds normal  MS: no gross musculoskeletal defects noted, no edema  SKIN: no suspicious lesions or rashes  NEURO: Normal strength and tone, mentation intact and speech normal  PSYCH: mentation appears normal, affect normal/bright    Diagnostic Test Results:  Results for orders placed or performed in visit on 12/14/18   CBC with platelets   Result Value Ref Range    WBC 6.0 4.0 - 11.0 10e9/L    RBC Count 4.91 4.4 - 5.9 10e12/L    Hemoglobin 14.8 13.3 - 17.7 g/dL    Hematocrit 44.9 40.0 - 53.0 %    MCV 91 78 - 100 fl    MCH 30.1 26.5 - 33.0 pg    MCHC 33.0 31.5 - 36.5 g/dL    RDW 13.7 10.0 - 15.0 %    Platelet Count 171 150 - 450 10e9/L   Comprehensive metabolic panel   Result Value Ref Range    Sodium 140 133 - 144 mmol/L    Potassium 4.3 3.4 - 5.3 mmol/L    Chloride 109 94 - 109 mmol/L    Carbon Dioxide 25 20 - 32 mmol/L    Anion Gap 6 3 - 14 mmol/L    Glucose 175 (H) 70 - 99 mg/dL    Urea Nitrogen 26 7 - 30 mg/dL    Creatinine 1.26 (H) 0.66 - 1.25 mg/dL    GFR Estimate 57 (L) >60 mL/min/1.7m2    GFR Estimate If Black 69 >60 mL/min/1.7m2    Calcium 8.7 8.5 - 10.1 mg/dL    Bilirubin Total 0.6 0.2 - 1.3 mg/dL    Albumin 3.9 3.4 - 5.0 g/dL    Protein Total 7.6 6.8 - 8.8 g/dL    Alkaline Phosphatase 92 40 - 150 U/L    ALT 42 0 - 70 U/L    AST 16 0 - 45 U/L   PSA, screen   Result Value Ref Range    PSA 4.80 (H) 0 - 4 ug/L   LDL cholesterol direct   Result Value Ref Range    LDL Cholesterol Direct 136 (H) <100 mg/dL       ASSESSMENT / PLAN:       ICD-10-CM    1. Routine history and physical examination of adult Z00.00 CBC with platelets     Comprehensive metabolic panel     PSA, screen     LDL cholesterol direct       End of Life Planning:  Patient  "currently has an advanced directive: Yes.  Practitioner is supportive of decision.    COUNSELING:  Reviewed preventive health counseling, as reflected in patient instructions       Regular exercise       Healthy diet/nutrition       Vision screening       Hearing screening       Dental care    BP Readings from Last 1 Encounters:   12/14/18 135/88     Estimated body mass index is 32.03 kg/m  as calculated from the following:    Height as of 11/2/16: 1.651 m (5' 5\").    Weight as of this encounter: 87.3 kg (192 lb 8 oz).    BP Screening:   Last 3 BP Readings:    BP Readings from Last 3 Encounters:   12/14/18 135/88   05/30/17 (!) 143/94   02/27/17 127/84       The following was recommended to the patient:  Re-screen BP within a year and recommended lifestyle modifications  Weight management plan: Discussed healthy diet and exercise guidelines     reports that  has never smoked. he has never used smokeless tobacco.      Appropriate preventive services were discussed with this patient, including applicable screening as appropriate for cardiovascular disease, diabetes, osteopenia/osteoporosis, and glaucoma.  As appropriate for age/gender, discussed screening for colorectal cancer, prostate cancer, breast cancer, and cervical cancer. Checklist reviewing preventive services available has been given to the patient.    Reviewed patients plan of care and provided an AVS. The Basic Care Plan (routine screening as documented in Health Maintenance) for Terrence meets the Care Plan requirement. This Care Plan has been established and reviewed with the Patient.    Counseling Resources:  ATP IV Guidelines  Pooled Cohorts Equation Calculator  Breast Cancer Risk Calculator  FRAX Risk Assessment  ICSI Preventive Guidelines  Dietary Guidelines for Americans, 2010  USDA's MyPlate  ASA Prophylaxis  Lung CA Screening    Jem Casper,   [facility]"

## 2018-12-14 NOTE — PATIENT INSTRUCTIONS
Preventive Health Recommendations:     See your health care provider every year to    Review health changes.     Discuss preventive care.      Review your medicines if your doctor has prescribed any.    Talk with your health care provider about whether you should have a test to screen for prostate cancer (PSA).    Every 3 years, have a diabetes test (fasting glucose). If you are at risk for diabetes, you should have this test more often.    Every 5 years, have a cholesterol test. Have this test more often if you are at risk for high cholesterol or heart disease.     Every 10 years, have a colonoscopy. Or, have a yearly FIT test (stool test). These exams will check for colon cancer.    Talk to with your health care provider about screening for Abdominal Aortic Aneurysm if you have a family history of AAA or have a history of smoking.  Shots:     Get a flu shot each year.     Get a tetanus shot every 10 years.     Talk to your doctor about your pneumonia vaccines. There are now two you should receive - Pneumovax (PPSV 23) and Prevnar (PCV 13).    Talk to your pharmacist about a shingles vaccine.     Talk to your doctor about the hepatitis B vaccine.  Nutrition:     Eat at least 5 servings of fruits and vegetables each day.     Eat whole-grain bread, whole-wheat pasta and brown rice instead of white grains and rice.     Get adequate Calcium and Vitamin D.   Lifestyle    Exercise for at least 150 minutes a week (30 minutes a day, 5 days a week). This will help you control your weight and prevent disease.     Limit alcohol to one drink per day.     No smoking.     Wear sunscreen to prevent skin cancer.     See your dentist every six months for an exam and cleaning.     See your eye doctor every 1 to 2 years to screen for conditions such as glaucoma, macular degeneration and cataracts.    Personalized Prevention Plan  You are due for the preventive services outlined below.  Your care team is available to assist you in  scheduling these services.  If you have already completed any of these items, please share that information with your care team to update in your medical record.    Health Maintenance Due   Topic Date Due     Discuss Advance Directive Planning  05/15/2007     Zoster (Chicken Pox) Vaccine (2 of 3) 01/03/2017     FALL RISK ASSESSMENT  05/15/2017     AORTIC ANEURYSM SCREENING (SYSTEM ASSIGNED)  05/15/2017     Depression Assessment 2 - yearly  11/02/2017     Flu Vaccine (1) 09/01/2018

## 2018-12-14 NOTE — PROGRESS NOTES
"  SUBJECTIVE:   Terrence Montoya is a 68 year old male who presents for Preventive Visit.  {PVP to remind patient that this is not necessarily a physical exam; physical exam may or may not be done:132594::\"click delete button to remove this line now\"}  {PVP to inform patient that additional E&M charge may apply, if additional problems addressed:991566::\"click delete button to remove this line now\"}  Are you in the first 12 months of your Medicare Part B coverage?  { :107942::\"No\"}    Physical Health:    In general, how would you rate your overall physical health? { :843350}    Outside of work, how many days during the week do you exercise? { :778346}    Outside of work, approximately how many minutes a day do you exercise?{ :829319}    If you drink alcohol do you typically have >3 drinks per day or >7 drinks per week? { :739416}    Do you usually eat at least 4 servings of fruit and vegetables a day, include whole grains & fiber and avoid regularly eating high fat or \"junk\" foods? { :080185::\"Yes\"}    Do you have any problems taking medications regularly?  { :520315::\"No\"}    Do you have any side effects from medications? { :962091}    Needs assistance for the following daily activities: { :823489}    Which of the following safety concerns are present in your home?  { :444831::\"none identified\"}     Hearing impairment: { :513028}    In the past 6 months, have you been bothered by leaking of urine? { :784413}    Mental Health:    In general, how would you rate your overall mental or emotional health? { :722161}  PHQ-2 Score: (P) 0    Do you feel safe in your environment? { :989020}    Do you have a Health Care Directive? { :769035}    Additional concerns to address?  {If YES, notify patient they may be responsible for a copay, coinsurance or deductible if the visit today includes services such as checking on a sore throat, having an x-ray or lab test, or treating and evaluating a new or existing condition " ":724407::\"No\"}    Fall risk:  { :215622}  {If any of the above assessments are answered yes, consider ordering appropriate referrals (Optional):385567::\"click delete button to remove this line now\"}  Cognitive Screening: { :818493}    {Do you have sleep apnea, excessive snoring or daytime drowsiness? (Optional):110108}    {Outside tests to abstract? :875601}    {additional problems to add (Optional):880640}    Reviewed and updated as needed this visit by clinical staff         Reviewed and updated as needed this visit by Provider        Social History     Tobacco Use     Smoking status: Never Smoker     Smokeless tobacco: Never Used   Substance Use Topics     Alcohol use: No     Alcohol/week: 0.0 oz                           Current providers sharing in care for this patient include:   Patient Care Team:  Jem Casper DO as PCP - General (Internal Medicine)    The following health maintenance items are reviewed in Epic and correct as of today:  Health Maintenance   Topic Date Due     ADVANCE DIRECTIVE PLANNING Q5 YRS  05/15/2007     ZOSTER IMMUNIZATION (2 of 3) 01/03/2017     FALL RISK ASSESSMENT  05/15/2017     AORTIC ANEURYSM SCREENING (SYSTEM ASSIGNED)  05/15/2017     PHQ-2 Q1 YR  11/02/2017     INFLUENZA VACCINE (1) 09/01/2018     LIPID SCREEN Q5 YR MALE (SYSTEM ASSIGNED)  11/02/2021     DTAP/TDAP/TD IMMUNIZATION (2 - Td) 11/30/2021     COLON CANCER SCREEN (SYSTEM ASSIGNED)  02/27/2027     HEPATITIS C SCREENING  Completed     IPV IMMUNIZATION  Aged Out     MENINGITIS IMMUNIZATION  Aged Out     {Chronicprobdata (Optional):362237}  {Decision Support (Optional):027432}    ROS:  {ROS COMP:056982}    OBJECTIVE:   There were no vitals taken for this visit. Estimated body mass index is 30.69 kg/m  as calculated from the following:    Height as of 11/2/16: 1.651 m (5' 5\").    Weight as of 11/2/16: 83.6 kg (184 lb 6.4 oz).  EXAM:   {Exam :112779}    {Diagnostic Test Results (Optional):134697::\"Diagnostic " "Test Results:\",\"none \"}    ASSESSMENT / PLAN:   {Diag Picklist:648263}    End of Life Planning:  Patient currently has an advanced directive: { :748273}    COUNSELING:  {Medicare Counselin}    BP Readings from Last 1 Encounters:   17 (!) 143/94     Estimated body mass index is 30.69 kg/m  as calculated from the following:    Height as of 16: 1.651 m (5' 5\").    Weight as of 16: 83.6 kg (184 lb 6.4 oz).    {BP Counseling- Complete if BP >= 120/80  (Optional):034214}  {Weight Management Plan (ACO) Complete if BMI is abnormal-  Ages 18-64  BMI >24.9.  Age 65+ with BMI <23 or >30 (Optional):259090}     reports that  has never smoked. he has never used smokeless tobacco.  {Tobacco Cessation -- Complete if patient is a smoker (Optional):346007}    Appropriate preventive services were discussed with this patient, including applicable screening as appropriate for cardiovascular disease, diabetes, osteopenia/osteoporosis, and glaucoma.  As appropriate for age/gender, discussed screening for colorectal cancer, prostate cancer, breast cancer, and cervical cancer. Checklist reviewing preventive services available has been given to the patient.    Reviewed patients plan of care and provided an AVS. The {CarePlan:893226} for Terrence meets the Care Plan requirement. This Care Plan has been established and reviewed with the {PATIENT, FAMILY MEMBER, CAREGIVER:015694}.    Counseling Resources:  ATP IV Guidelines  Pooled Cohorts Equation Calculator  Breast Cancer Risk Calculator  FRAX Risk Assessment  ICSI Preventive Guidelines  Dietary Guidelines for Americans,   Online Dealer's MyPlate  ASA Prophylaxis  Lung CA Screening    Jemlindsay Casper, DO  [facility]"

## 2018-12-20 NOTE — RESULT ENCOUNTER NOTE
Dear Terrence, your recent test results are attached.  The chemistry panel shows a blood sugar of 175 which is somewhat elevated.  Kidney function slightly decreased with a creatinine of 1.26.  Liver tests are normal.  Cholesterol slightly elevated with an LDL of 136.  The PSA has improved and is now down to 4.8.  The blood cell count is normal without evidence of anemia or leukemia.    Feel free to contact me via the office or My Chart if you have any questions regarding the above.  Sincerely,  Jem Casper,  FACOI

## 2019-01-23 ENCOUNTER — DOCUMENTATION ONLY (OUTPATIENT)
Dept: INTERNAL MEDICINE | Facility: CLINIC | Age: 69
End: 2019-01-23

## 2019-01-23 VITALS
DIASTOLIC BLOOD PRESSURE: 88 MMHG | SYSTOLIC BLOOD PRESSURE: 135 MMHG | BODY MASS INDEX: 32.03 KG/M2 | RESPIRATION RATE: 18 BRPM | TEMPERATURE: 97.8 F | OXYGEN SATURATION: 97 % | WEIGHT: 192.5 LBS | HEART RATE: 72 BPM

## 2019-01-23 NOTE — PROGRESS NOTES
Called and LM for patient to return call in regards to message below. Please relay message and help set up Diabetic and hypertension follow up with pcp.   Toshia Zamarripa MA

## 2019-01-23 NOTE — PROGRESS NOTES
Recent blood pressure and blood sugar were slightly elevated.  The patient was intended to come back for follow-up.  Could you please confirm that he has an appointment and if not, help him set this up?    Thank you.    Trem[place]ne

## 2019-01-24 ENCOUNTER — TELEPHONE (OUTPATIENT)
Dept: INTERNAL MEDICINE | Facility: CLINIC | Age: 69
End: 2019-01-24

## 2019-01-24 NOTE — TELEPHONE ENCOUNTER
Reason for Call:  Other call back    Detailed comments: Pt was unaware that he was to f/u in clinic. He stated that Dr. Casper told him not to worry about the hypertension for now as well. Also he isn't available til March 15th to be seen, he stated he works at 4am to 7pm and there's no way he can get in before 3/15. He did schedule for March 15th though, he will cancel the appt if you don't want to see him. Please call him and advise.     Phone Number Patient can be reached at: Cell number on file:    Telephone Information:   Mobile [phone]       Best Time: anytime    Can we leave a detailed message on this number? YES    Call taken on 1/24/2019 at 10:39 AM by Odilia Win

## 2019-03-15 ENCOUNTER — OFFICE VISIT (OUTPATIENT)
Dept: FAMILY MEDICINE | Facility: OTHER | Age: 69
End: 2019-03-15
Payer: COMMERCIAL

## 2019-03-15 VITALS
TEMPERATURE: 97.3 F | BODY MASS INDEX: 32.32 KG/M2 | SYSTOLIC BLOOD PRESSURE: 170 MMHG | RESPIRATION RATE: 16 BRPM | DIASTOLIC BLOOD PRESSURE: 90 MMHG | OXYGEN SATURATION: 97 % | WEIGHT: 194 LBS | HEART RATE: 78 BPM | HEIGHT: 65 IN

## 2019-03-15 DIAGNOSIS — Z23 NEED FOR VACCINATION: ICD-10-CM

## 2019-03-15 DIAGNOSIS — E78.5 HYPERLIPIDEMIA LDL GOAL <130: ICD-10-CM

## 2019-03-15 DIAGNOSIS — I10 ESSENTIAL HYPERTENSION: Primary | ICD-10-CM

## 2019-03-15 DIAGNOSIS — R35.1 BENIGN PROSTATIC HYPERPLASIA WITH NOCTURIA: ICD-10-CM

## 2019-03-15 DIAGNOSIS — R73.09 ELEVATED GLUCOSE: ICD-10-CM

## 2019-03-15 DIAGNOSIS — N40.1 BENIGN PROSTATIC HYPERPLASIA WITH NOCTURIA: ICD-10-CM

## 2019-03-15 LAB
ANION GAP SERPL CALCULATED.3IONS-SCNC: 8 MMOL/L (ref 3–14)
BUN SERPL-MCNC: 18 MG/DL (ref 7–30)
CALCIUM SERPL-MCNC: 8.6 MG/DL (ref 8.5–10.1)
CHLORIDE SERPL-SCNC: 110 MMOL/L (ref 94–109)
CHOLEST SERPL-MCNC: 189 MG/DL
CO2 SERPL-SCNC: 25 MMOL/L (ref 20–32)
CREAT SERPL-MCNC: 1.19 MG/DL (ref 0.66–1.25)
GFR SERPL CREATININE-BSD FRML MDRD: 63 ML/MIN/{1.73_M2}
GLUCOSE SERPL-MCNC: 176 MG/DL (ref 70–99)
HBA1C MFR BLD: 8.2 % (ref 0–5.6)
HDLC SERPL-MCNC: 40 MG/DL
LDLC SERPL CALC-MCNC: 131 MG/DL
NONHDLC SERPL-MCNC: 149 MG/DL
POTASSIUM SERPL-SCNC: 4.3 MMOL/L (ref 3.4–5.3)
SODIUM SERPL-SCNC: 143 MMOL/L (ref 133–144)
TRIGL SERPL-MCNC: 90 MG/DL

## 2019-03-15 PROCEDURE — 90732 PPSV23 VACC 2 YRS+ SUBQ/IM: CPT | Performed by: INTERNAL MEDICINE

## 2019-03-15 PROCEDURE — 80048 BASIC METABOLIC PNL TOTAL CA: CPT | Performed by: INTERNAL MEDICINE

## 2019-03-15 PROCEDURE — 36415 COLL VENOUS BLD VENIPUNCTURE: CPT | Performed by: INTERNAL MEDICINE

## 2019-03-15 PROCEDURE — 83036 HEMOGLOBIN GLYCOSYLATED A1C: CPT | Performed by: INTERNAL MEDICINE

## 2019-03-15 PROCEDURE — 80061 LIPID PANEL: CPT | Performed by: INTERNAL MEDICINE

## 2019-03-15 PROCEDURE — 90471 IMMUNIZATION ADMIN: CPT | Performed by: INTERNAL MEDICINE

## 2019-03-15 PROCEDURE — 99214 OFFICE O/P EST MOD 30 MIN: CPT | Mod: 25 | Performed by: INTERNAL MEDICINE

## 2019-03-15 RX ORDER — TAMSULOSIN HYDROCHLORIDE 0.4 MG/1
0.4 CAPSULE ORAL DAILY
Qty: 30 CAPSULE | Refills: 0 | Status: SHIPPED | OUTPATIENT
Start: 2019-03-15 | End: 2019-08-26

## 2019-03-15 RX ORDER — FINASTERIDE 5 MG/1
5 TABLET, FILM COATED ORAL DAILY
Qty: 30 TABLET | Refills: 0 | Status: SHIPPED | OUTPATIENT
Start: 2019-03-15 | End: 2019-08-26

## 2019-03-15 RX ORDER — LISINOPRIL 10 MG/1
10 TABLET ORAL DAILY
Qty: 30 TABLET | Refills: 0 | Status: SHIPPED | OUTPATIENT
Start: 2019-03-15 | End: 2019-04-17

## 2019-03-15 ASSESSMENT — PAIN SCALES - GENERAL: PAINLEVEL: NO PAIN (0)

## 2019-03-15 ASSESSMENT — MIFFLIN-ST. JEOR: SCORE: 1586.86

## 2019-03-15 NOTE — PROGRESS NOTES
SUBJECTIVE:   Terrence Montoya is a 68 year old male who presents to clinic today for the following health issues:      Diabetes Follow-up    Patient is checking blood sugars: once daily.  Results are as follows:         150s    Diabetic concerns: None     Symptoms of hypoglycemia (low blood sugar): none     Paresthesias (numbness or burning in feet) or sores: No     Date of last diabetic eye exam: 1 year ago, is scheduled for one in 2 weeks    BP Readings from Last 2 Encounters:   03/15/19 170/90   12/14/18 135/88     LDL Cholesterol Calculated (mg/dL)   Date Value   11/02/2016 150 (H)     LDL Cholesterol Direct (mg/dL)   Date Value   12/14/2018 136 (H)       Diabetes Management Resources  Hypertension Follow-up      Outpatient blood pressures are being checked at home.  Results are high.    Low Salt Diet: no added salt      Amount of exercise or physical activity: 1hr per day    Problems taking medications regularly: No    Medication side effects: none    Diet: regular (no restrictions)               Chief Complaint    The patient is a pleasant 68-year-old gentleman who presents today for follow-up of his elevated blood pressure and blood sugar.  His glucose has been slightly elevated in the past and this is being followed.  Additionally his blood pressure has been in the 150 systolic range on many occasions.  He denies any chest pain or shortness of breath.  He has otherwise been feeling well.  He does note that he is developing some prostate symptoms.  He has nocturia, frequency and some hesitancy.  His PSA levels have been slightly elevated in the past but are actually coming down.                       PAST, FAMILY,SOCIAL HISTORY:     Medical  History:   has a past medical history of Brain tumor (benign) (H) and Emphysema.     Surgical History:   has a past surgical history that includes Colonoscopy (N/A, 2/27/2017).     Social History:   reports that  has never smoked. he has never used  "smokeless tobacco. He reports that he does not drink alcohol or use drugs.     Family History:  family history includes Other Cancer in his brother, father, and mother; Prostate Cancer in his paternal grandfather.            MEDICATIONS  Current Outpatient Medications   Medication Sig Dispense Refill     finasteride (PROSCAR) 5 MG tablet Take 1 tablet (5 mg) by mouth daily 30 tablet 0     lisinopril (PRINIVIL/ZESTRIL) 10 MG tablet Take 1 tablet (10 mg) by mouth daily 30 tablet 0     tamsulosin (FLOMAX) 0.4 MG capsule Take 1 capsule (0.4 mg) by mouth daily 30 capsule 0         --------------------------------------------------------------------------------------------------------------------                              Review of Systems       LUNGS: Pt denies: cough, excess sputum, hemoptysis, or shortness of breath.   HEART: Pt denies: chest pain, arrhythmia, syncope, tachy or bradyarrhythmia.   GI: Pt denies: nausea, vomiting, diarrhea, constipation, melena, or hematochezia.   NEURO: Pt denies: seizures, strokes, diplopia, weakness, paraesthesias, or paralysis.   SKIN: Pt denies: itching, rashes, discoloration, or specific lesions of concern. Denies recent hair loss.   PSYCH: The patient denies significant depression, anxiety, mood imbalance. Specifically denies any suicidal ideation.                                     Examination      /90 (BP Location: Left arm, Patient Position: Sitting, Cuff Size: Adult Regular)   Pulse 78   Temp 97.3  F (36.3  C) (Temporal)   Resp 16   Ht 1.651 m (5' 5\")   Wt 88 kg (194 lb)   SpO2 97%   BMI 32.28 kg/m     Constitutional: The patient appears to be in no acute distress. The patient appears to be adequately hydrated. No acute respiratory or hemodynamic distress is noted at this time.   LUNGS: clear bilaterally, airflow is brisk, no intercostal retraction or stridor is noted. No coughing is noted during visit.   HEART:  regular without rubs, clicks, gallops, or " "murmurs. PMI is nondisplaced. Upstrokes are brisk. S1,S2 are heard.   GI: Abdomen is soft, without rebound, guarding or tenderness. Bowel sounds are appropriate. No renal bruits are heard.   NEURO: Pt is alert and appropriate. No neurologic lateralization is noted. Cranial nerves 2-12 are intact. Peripheral sensory and motor function are grossly normal.    SKIN:  warm and dry. No erythema, or rashes are noted. No specific lesions of concern are noted.    PSYCH: The patient appears grossly appropriate. Maintains good eye contact, does not have any jittery or atypical motion. Displays appropriate affect.                                           Decision Making    1. Essential hypertension  Recommend starting lisinopril 10 mg daily.  Patient aware of potential side effects.  - lisinopril (PRINIVIL/ZESTRIL) 10 MG tablet; Take 1 tablet (10 mg) by mouth daily  Dispense: 30 tablet; Refill: 0  - Basic metabolic panel    2. Elevated glucose  Check A1c and considered initiating medication predicated upon those results.  Patient will continue low carbohydrate low sugar diet.  - Hemoglobin A1c    3. Hyperlipidemia LDL goal <130  Check lipids and adjust medications accordingly.  May require the initiation of statin especially if blood sugars come back elevated patient received diagnosis of \"diabetes\"  - Lipid Profile    4. Benign prostatic hyperplasia with nocturia  Treat appropriately as noted.  - tamsulosin (FLOMAX) 0.4 MG capsule; Take 1 capsule (0.4 mg) by mouth daily  Dispense: 30 capsule; Refill: 0  - finasteride (PROSCAR) 5 MG tablet; Take 1 tablet (5 mg) by mouth daily  Dispense: 30 tablet; Refill: 0                           FOLLOW UP   I have asked the patient to make an appointment for followup with me in 1 month        I have carefully explained the diagnosis and treatment options to the patient.  The patient has displayed an understanding of the above, and all subsequent questions were answered.      Jem " DO LEONARDO Casper    Portions of this note were produced using [employer]  Although every attempt at real-time proof reading has been made, occasional grammar/syntax errors may have been missed.

## 2019-03-17 NOTE — RESULT ENCOUNTER NOTE
Dear Terrence, your recent test results are attached.  The chemistry panel shows an elevated blood sugar 176 and normal kidney function.  Cholesterol is somewhat elevated with an LDL of 131  Hemoglobin A1c is elevated at 8.2 suggesting the need for improved blood sugar management.  An appointment should be set up to discuss improved blood sugar management.    Feel free to contact me via the office or My Chart if you have any questions regarding the above.  Sincerely,  Jem Casper DO FACOI

## 2019-04-17 ENCOUNTER — MYC REFILL (OUTPATIENT)
Dept: FAMILY MEDICINE | Facility: OTHER | Age: 69
End: 2019-04-17

## 2019-04-17 DIAGNOSIS — I10 ESSENTIAL HYPERTENSION: ICD-10-CM

## 2019-04-19 RX ORDER — LISINOPRIL 10 MG/1
10 TABLET ORAL DAILY
Qty: 30 TABLET | Refills: 0 | Status: SHIPPED | OUTPATIENT
Start: 2019-04-19 | End: 2019-05-22

## 2019-04-19 NOTE — TELEPHONE ENCOUNTER
Routing refill request to provider for review/approval because:  Labs out of range:  BP    BRIDGET RamirezN, RN  [facility]

## 2019-04-19 NOTE — TELEPHONE ENCOUNTER
"Requested Prescriptions   Pending Prescriptions Disp Refills     lisinopril (PRINIVIL/ZESTRIL) 10 MG tablet 30 tablet 0     Sig: Take 1 tablet (10 mg) by mouth daily   Last Written Prescription Date:  3/15/19  Last Fill Quantity: 30,  # refills: 0   Last office visit: 3/15/2019 with prescribing provider:  3/15/19   Future Office Visit:        ACE Inhibitors (Including Combos) Protocol Failed - 4/18/2019  6:58 AM        Failed - Blood pressure under 140/90 in past 12 months     BP Readings from Last 3 Encounters:   03/15/19 170/90   12/14/18 135/88   05/30/17 (!) 143/94                 Passed - Recent (12 mo) or future (30 days) visit within the authorizing provider's specialty     Patient had office visit in the last 12 months or has a visit in the next 30 days with authorizing provider or within the authorizing provider's specialty.  See \"Patient Info\" tab in inbasket, or \"Choose Columns\" in Meds & Orders section of the refill encounter.              Passed - Medication is active on med list        Passed - Patient is age 18 or older        Passed - Normal serum creatinine on file in past 12 months     Recent Labs   Lab Test 03/15/19  0744   CR 1.19             Passed - Normal serum potassium on file in past 12 months     Recent Labs   Lab Test 03/15/19  0744   POTASSIUM 4.3             "

## 2019-05-17 ENCOUNTER — TRANSFERRED RECORDS (OUTPATIENT)
Dept: HEALTH INFORMATION MANAGEMENT | Facility: CLINIC | Age: 69
End: 2019-05-17

## 2019-05-22 ENCOUNTER — MYC REFILL (OUTPATIENT)
Dept: FAMILY MEDICINE | Facility: OTHER | Age: 69
End: 2019-05-22

## 2019-05-22 DIAGNOSIS — I10 ESSENTIAL HYPERTENSION: ICD-10-CM

## 2019-05-23 RX ORDER — LISINOPRIL 10 MG/1
10 TABLET ORAL DAILY
Qty: 30 TABLET | Refills: 0 | Status: SHIPPED | OUTPATIENT
Start: 2019-05-23 | End: 2019-07-02

## 2019-05-23 NOTE — TELEPHONE ENCOUNTER
"lisinopril  Last Written Prescription Date:  4/19/2019  Last Fill Quantity: 30,  # refills: 0   Last office visit: 3/15/2019 with prescribing provider:  Tremayne   Future Office Visit:      Requested Prescriptions   Pending Prescriptions Disp Refills     lisinopril (PRINIVIL/ZESTRIL) 10 MG tablet 30 tablet 0     Sig: Take 1 tablet (10 mg) by mouth daily       ACE Inhibitors (Including Combos) Protocol Failed - 5/23/2019  8:01 AM        Failed - Blood pressure under 140/90 in past 12 months     BP Readings from Last 3 Encounters:   03/15/19 170/90   12/14/18 135/88   05/30/17 (!) 143/94                 Passed - Recent (12 mo) or future (30 days) visit within the authorizing provider's specialty     Patient had office visit in the last 12 months or has a visit in the next 30 days with authorizing provider or within the authorizing provider's specialty.  See \"Patient Info\" tab in inbasket, or \"Choose Columns\" in Meds & Orders section of the refill encounter.              Passed - Medication is active on med list        Passed - Patient is age 18 or older        Passed - Normal serum creatinine on file in past 12 months     Recent Labs   Lab Test 03/15/19  0744   CR 1.19             Passed - Normal serum potassium on file in past 12 months     Recent Labs   Lab Test 03/15/19  0744   POTASSIUM 4.3             Routing refill request to provider for review/approval because:  Monisha given x1 and patient did not follow up, please advise    Isabel Stubbs RN on 5/23/2019 at 1:22 PM    "

## 2019-06-18 ENCOUNTER — TRANSFERRED RECORDS (OUTPATIENT)
Dept: HEALTH INFORMATION MANAGEMENT | Facility: CLINIC | Age: 69
End: 2019-06-18

## 2019-06-19 ENCOUNTER — TRANSFERRED RECORDS (OUTPATIENT)
Dept: HEALTH INFORMATION MANAGEMENT | Facility: CLINIC | Age: 69
End: 2019-06-19

## 2019-07-02 ENCOUNTER — MYC REFILL (OUTPATIENT)
Dept: FAMILY MEDICINE | Facility: OTHER | Age: 69
End: 2019-07-02

## 2019-07-02 DIAGNOSIS — I10 ESSENTIAL HYPERTENSION: ICD-10-CM

## 2019-07-02 RX ORDER — LISINOPRIL 10 MG/1
10 TABLET ORAL DAILY
Qty: 30 TABLET | Refills: 1 | Status: SHIPPED | OUTPATIENT
Start: 2019-07-02 | End: 2019-09-09

## 2019-07-02 NOTE — TELEPHONE ENCOUNTER
"Requested Prescriptions   Pending Prescriptions Disp Refills     lisinopril (PRINIVIL/ZESTRIL) 10 MG tablet 30 tablet 0     Sig: Take 1 tablet (10 mg) by mouth daily   Last Written Prescription Date:  5/23/19  Last Fill Quantity: 30,  # refills: 0   Last office visit: 3/15/2019 with prescribing provider:  3/15/19   Future Office Visit:        ACE Inhibitors (Including Combos) Protocol Failed - 7/2/2019  8:09 AM        Failed - Blood pressure under 140/90 in past 12 months     BP Readings from Last 3 Encounters:   03/15/19 170/90   12/14/18 135/88   05/30/17 (!) 143/94                 Passed - Recent (12 mo) or future (30 days) visit within the authorizing provider's specialty     Patient had office visit in the last 12 months or has a visit in the next 30 days with authorizing provider or within the authorizing provider's specialty.  See \"Patient Info\" tab in inbasket, or \"Choose Columns\" in Meds & Orders section of the refill encounter.              Passed - Medication is active on med list        Passed - Patient is age 18 or older        Passed - Normal serum creatinine on file in past 12 months     Recent Labs   Lab Test 03/15/19  0744   CR 1.19             Passed - Normal serum potassium on file in past 12 months     Recent Labs   Lab Test 03/15/19  0744   POTASSIUM 4.3             "

## 2019-07-02 NOTE — TELEPHONE ENCOUNTER
Routing refill request to provider for review/approval because:  Labs out of range:  BP    BRIDGET RamirezN, RN  [facility]

## 2019-08-26 ENCOUNTER — OFFICE VISIT (OUTPATIENT)
Dept: URGENT CARE | Facility: RETAIL CLINIC | Age: 69
End: 2019-08-26
Payer: COMMERCIAL

## 2019-08-26 VITALS
TEMPERATURE: 98.3 F | OXYGEN SATURATION: 97 % | HEART RATE: 75 BPM | DIASTOLIC BLOOD PRESSURE: 93 MMHG | SYSTOLIC BLOOD PRESSURE: 159 MMHG

## 2019-08-26 DIAGNOSIS — J40 BRONCHITIS: Primary | ICD-10-CM

## 2019-08-26 PROCEDURE — 99213 OFFICE O/P EST LOW 20 MIN: CPT | Performed by: FAMILY MEDICINE

## 2019-08-26 RX ORDER — AZITHROMYCIN 250 MG/1
TABLET, FILM COATED ORAL
Qty: 6 TABLET | Refills: 0 | Status: SHIPPED | OUTPATIENT
Start: 2019-08-26 | End: 2020-11-06

## 2019-08-26 NOTE — PROGRESS NOTES
"SUBJECTIVE:  Terrence Montoya is a 69 year old male who presents to the clinic today with a chief complaint of cough  for 2 week(s).  His cough is described as persistent and productive of yellow sputum.    The patient's symptoms are moderate and worsening.  Associated symptoms include none. The patient's symptoms are exacerbated by no particular triggers  Patient has been using OTC cough suppressants  to improve symptoms.    Past Medical History:   Diagnosis Date     Brain tumor (benign) (H)      Emphysema     \"runners lung\"     Current Outpatient Medications   Medication Sig Dispense Refill     azithromycin (ZITHROMAX) 250 MG tablet Two tablets first day, then one tablet daily for four days. 6 tablet 0     lisinopril (PRINIVIL/ZESTRIL) 10 MG tablet Take 1 tablet (10 mg) by mouth daily 30 tablet 1     History   Smoking Status     Never Smoker   Smokeless Tobacco     Never Used       ROS  Review of systems negative except as stated above.    OBJECTIVE:  BP (!) 159/93   Pulse 75   Temp 98.3  F (36.8  C) (Oral)   SpO2 97%   GENERAL APPEARANCE: healthy, alert and no distress  EYES: EOMI,  PERRL, conjunctiva clear  HENT: ear canals and TM's normal.  Nose and mouth without ulcers, erythema or lesions  NECK: supple, nontender, no lymphadenopathy  RESP: rhonchi L lower posterior  CV: regular rates and rhythm, normal S1 S2, no murmur noted  ABDOMEN:  soft, nontender, no HSM or masses and bowel sounds normal  NEURO: Normal strength and tone, sensory exam grossly normal,  normal speech and mentation  SKIN: no suspicious lesions or rashes    ASSESSMENT:    Acute Bronchitis    PLAN:  Zithromax, fluids, rest.  Symptomatic measures encouraged, humidified air, plenty of fluids.  Follow up with primary care provider if no improvement.  "

## 2019-09-09 ENCOUNTER — MYC REFILL (OUTPATIENT)
Dept: FAMILY MEDICINE | Facility: OTHER | Age: 69
End: 2019-09-09

## 2019-09-09 DIAGNOSIS — I10 ESSENTIAL HYPERTENSION: ICD-10-CM

## 2019-09-10 ENCOUNTER — TRANSFERRED RECORDS (OUTPATIENT)
Dept: HEALTH INFORMATION MANAGEMENT | Facility: CLINIC | Age: 69
End: 2019-09-10

## 2019-09-10 RX ORDER — LISINOPRIL 10 MG/1
10 TABLET ORAL DAILY
Qty: 30 TABLET | Refills: 1 | Status: SHIPPED | OUTPATIENT
Start: 2019-09-10 | End: 2019-11-19

## 2019-09-10 NOTE — TELEPHONE ENCOUNTER
"Lisinopril  Last Written Prescription Date:  07/02/2019  Last Fill Quantity: 30,  # refills: 1   Last office visit: 3/15/2019 with prescribing provider:  jessica   Future Office Visit:  None  Routing refill request to provider for review/approval because:  Blood pressures elevated above goal.     Requested Prescriptions   Pending Prescriptions Disp Refills     lisinopril (PRINIVIL/ZESTRIL) 10 MG tablet 30 tablet 1     Sig: Take 1 tablet (10 mg) by mouth daily       ACE Inhibitors (Including Combos) Protocol Failed - 9/10/2019  8:49 AM        Failed - Blood pressure under 140/90 in past 12 months     BP Readings from Last 3 Encounters:   08/26/19 (!) 159/93   03/15/19 170/90   12/14/18 135/88           Passed - Recent (12 mo) or future (30 days) visit within the authorizing provider's specialty     Patient had office visit in the last 12 months or has a visit in the next 30 days with authorizing provider or within the authorizing provider's specialty.  See \"Patient Info\" tab in inbasket, or \"Choose Columns\" in Meds & Orders section of the refill encounter.          Passed - Medication is active on med list        Passed - Patient is age 18 or older        Passed - Normal serum creatinine on file in past 12 months     Recent Labs   Lab Test 03/15/19  0744   CR 1.19           Passed - Normal serum potassium on file in past 12 months     Recent Labs   Lab Test 03/15/19  0744   POTASSIUM 4.3         Jaylyn Boudreaux RN   "

## 2019-09-28 ENCOUNTER — HEALTH MAINTENANCE LETTER (OUTPATIENT)
Age: 69
End: 2019-09-28

## 2019-11-19 ENCOUNTER — MYC REFILL (OUTPATIENT)
Dept: FAMILY MEDICINE | Facility: OTHER | Age: 69
End: 2019-11-19

## 2019-11-19 DIAGNOSIS — I10 ESSENTIAL HYPERTENSION: ICD-10-CM

## 2019-11-20 RX ORDER — LISINOPRIL 10 MG/1
10 TABLET ORAL DAILY
Qty: 30 TABLET | Refills: 0 | Status: SHIPPED | OUTPATIENT
Start: 2019-11-20 | End: 2019-12-09

## 2019-11-20 NOTE — TELEPHONE ENCOUNTER
"Routing refill request to provider for review/approval because:  BP out of range  T'd up 1 month for provider review.    Requested Prescriptions   Pending Prescriptions Disp Refills     lisinopril (PRINIVIL/ZESTRIL) 10 MG tablet 30 tablet 1     Sig: Take 1 tablet (10 mg) by mouth daily   Last Written Prescription Date:  9/10/2019  Last Fill Quantity: 30,  # refills: 1   Last office visit: 3/15/2019 with prescribing provider:     Future Office Visit:        ACE Inhibitors (Including Combos) Protocol Failed - 11/20/2019  8:56 AM        Failed - Blood pressure under 140/90 in past 12 months     BP Readings from Last 3 Encounters:   08/26/19 (!) 159/93   03/15/19 170/90   12/14/18 135/88           Passed - Recent (12 mo) or future (30 days) visit within the authorizing provider's specialty     Patient has had an office visit with the authorizing provider or a provider within the authorizing providers department within the previous 12 mos or has a future within next 30 days. See \"Patient Info\" tab in inbasket, or \"Choose Columns\" in Meds & Orders section of the refill encounter.            Passed - Medication is active on med list        Passed - Patient is age 18 or older        Passed - Normal serum creatinine on file in past 12 months     Recent Labs   Lab Test 03/15/19  0744   CR 1.19           Passed - Normal serum potassium on file in past 12 months     Recent Labs   Lab Test 03/15/19  0744   POTASSIUM 4.3         Heide Almanza RN      "

## 2019-12-09 ENCOUNTER — MYC REFILL (OUTPATIENT)
Dept: FAMILY MEDICINE | Facility: OTHER | Age: 69
End: 2019-12-09

## 2019-12-09 DIAGNOSIS — I10 ESSENTIAL HYPERTENSION: ICD-10-CM

## 2019-12-10 RX ORDER — LISINOPRIL 10 MG/1
10 TABLET ORAL DAILY
Qty: 30 TABLET | Refills: 0 | Status: SHIPPED | OUTPATIENT
Start: 2019-12-10 | End: 2020-02-24

## 2019-12-10 NOTE — TELEPHONE ENCOUNTER
lisinopril (PRINIVIL/ZESTRIL) 10 MG tablet [Jem Casper, ]      Patient Comment: I am leaving for [place] on 12- and will be gone until 1- and don't have enough pills to last until I return. I had an appointment scheduled for 12- but had to cancel it because insurance wouldn't cover it.    Preferred pharmacy: [address] - [facility] [address]    Patient message from [place].     Toshia Zamarripa MA

## 2020-02-24 ENCOUNTER — MYC REFILL (OUTPATIENT)
Dept: FAMILY MEDICINE | Facility: OTHER | Age: 70
End: 2020-02-24

## 2020-02-24 DIAGNOSIS — I10 ESSENTIAL HYPERTENSION: ICD-10-CM

## 2020-02-25 RX ORDER — LISINOPRIL 10 MG/1
10 TABLET ORAL DAILY
Qty: 30 TABLET | Refills: 0 | Status: SHIPPED | OUTPATIENT
Start: 2020-02-25 | End: 2020-03-31

## 2020-02-25 NOTE — TELEPHONE ENCOUNTER
Lisinopril  Routing refill request to provider for review/approval because:  Blood pressures elevated above goal.     Jaylyn Boudreaux RN

## 2020-02-25 NOTE — TELEPHONE ENCOUNTER
"Requested Prescriptions   Pending Prescriptions Disp Refills     lisinopril (ZESTRIL) 10 MG tablet 30 tablet 0     Sig: Take 1 tablet (10 mg) by mouth daily   Last Written Prescription Date:  12/10/2019  Last Fill Quantity: 30,  # refills: 0   Last office visit: 3/15/2019 with prescribing provider:  03/15/2019   Future Office Visit:        ACE Inhibitors (Including Combos) Protocol Failed - 2/24/2020  5:53 PM        Failed - Blood pressure under 140/90 in past 12 months     BP Readings from Last 3 Encounters:   08/26/19 (!) 159/93   03/15/19 170/90   12/14/18 135/88                 Passed - Recent (12 mo) or future (30 days) visit within the authorizing provider's specialty     Patient has had an office visit with the authorizing provider or a provider within the authorizing providers department within the previous 12 mos or has a future within next 30 days. See \"Patient Info\" tab in inbasket, or \"Choose Columns\" in Meds & Orders section of the refill encounter.              Passed - Medication is active on med list        Passed - Patient is age 18 or older        Passed - Normal serum creatinine on file in past 12 months     Recent Labs   Lab Test 03/15/19  0744   CR 1.19             Passed - Normal serum potassium on file in past 12 months     Recent Labs   Lab Test 03/15/19  0744   POTASSIUM 4.3               "

## 2020-03-15 ENCOUNTER — HEALTH MAINTENANCE LETTER (OUTPATIENT)
Age: 70
End: 2020-03-15

## 2020-03-31 ENCOUNTER — MYC REFILL (OUTPATIENT)
Dept: FAMILY MEDICINE | Facility: OTHER | Age: 70
End: 2020-03-31

## 2020-03-31 DIAGNOSIS — I10 ESSENTIAL HYPERTENSION: ICD-10-CM

## 2020-04-01 RX ORDER — LISINOPRIL 10 MG/1
10 TABLET ORAL DAILY
Qty: 30 TABLET | Refills: 0 | Status: SHIPPED | OUTPATIENT
Start: 2020-04-01 | End: 2020-05-15

## 2020-04-01 NOTE — TELEPHONE ENCOUNTER
Routing refill request to provider for review/approval because:  Monisha given x1 and patient did not follow up, please advise  Labs out of range:  BP  Labs not current:  BP, Creatinine, Potassium  Patient needs to be seen because it has been more than 1 year since last office visit.    Jenny Matthew, BSN, RN  [facility]

## 2020-04-01 NOTE — TELEPHONE ENCOUNTER
"Requested Prescriptions   Pending Prescriptions Disp Refills     lisinopril (ZESTRIL) 10 MG tablet 30 tablet 0     Sig: Take 1 tablet (10 mg) by mouth daily   Last Written Prescription Date:  2/25/2020  Last Fill Quantity: 30,  # refills: 0   Last office visit: 3/15/2019 with prescribing provider:  3/15/19   Future Office Visit:        ACE Inhibitors (Including Combos) Protocol Failed - 3/31/2020  7:32 PM        Failed - Blood pressure under 140/90 in past 12 months     BP Readings from Last 3 Encounters:   08/26/19 (!) 159/93   03/15/19 170/90   12/14/18 135/88                 Failed - Recent (12 mo) or future (30 days) visit within the authorizing provider's specialty     Patient has had an office visit with the authorizing provider or a provider within the authorizing providers department within the previous 12 mos or has a future within next 30 days. See \"Patient Info\" tab in inbasket, or \"Choose Columns\" in Meds & Orders section of the refill encounter.              Failed - Normal serum creatinine on file in past 12 months     Recent Labs   Lab Test 03/15/19  0744   CR 1.19       Ok to refill medication if creatinine is low          Failed - Normal serum potassium on file in past 12 months     Recent Labs   Lab Test 03/15/19  0744   POTASSIUM 4.3             Passed - Medication is active on med list        Passed - Patient is age 18 or older           "

## 2020-05-15 ENCOUNTER — MYC REFILL (OUTPATIENT)
Dept: FAMILY MEDICINE | Facility: OTHER | Age: 70
End: 2020-05-15

## 2020-05-15 DIAGNOSIS — I10 ESSENTIAL HYPERTENSION: ICD-10-CM

## 2020-05-18 RX ORDER — LISINOPRIL 10 MG/1
10 TABLET ORAL DAILY
Qty: 30 TABLET | Refills: 0 | Status: SHIPPED | OUTPATIENT
Start: 2020-05-18 | End: 2020-07-13

## 2020-05-18 NOTE — TELEPHONE ENCOUNTER
Routing refill request to provider for review/approval because:  Monisha given x1 and patient did not follow up, please advise  Labs out of range:  BP  Labs not current:  Creatinine, Potassium  Patient needs to be seen because it has been more than 1 year since last office visit.    Jenny Matthew, BSN, RN  [facility]

## 2020-07-13 ENCOUNTER — MYC REFILL (OUTPATIENT)
Dept: FAMILY MEDICINE | Facility: OTHER | Age: 70
End: 2020-07-13

## 2020-07-13 DIAGNOSIS — I10 ESSENTIAL HYPERTENSION: ICD-10-CM

## 2020-07-13 RX ORDER — LISINOPRIL 10 MG/1
10 TABLET ORAL DAILY
Qty: 90 TABLET | Refills: 0 | Status: SHIPPED | OUTPATIENT
Start: 2020-07-13 | End: 2021-10-07

## 2020-07-13 NOTE — TELEPHONE ENCOUNTER
Routing refill request to provider for review/approval because:  Labs out of range:  BP  Labs not current:  Creatinine, Potassium    BRIDGET RamirezN, RN  [facility]

## 2020-11-05 ASSESSMENT — ENCOUNTER SYMPTOMS
NERVOUS/ANXIOUS: 0
FREQUENCY: 0
ABDOMINAL PAIN: 0
ARTHRALGIAS: 1
SORE THROAT: 0
HEADACHES: 0
NAUSEA: 0
JOINT SWELLING: 0
COUGH: 0
PARESTHESIAS: 0
HEMATURIA: 0
MYALGIAS: 0
DIZZINESS: 0
WEAKNESS: 0
SHORTNESS OF BREATH: 0
HEMATOCHEZIA: 0
CHILLS: 0
CONSTIPATION: 0
FEVER: 0
DIARRHEA: 0
DYSURIA: 0
PALPITATIONS: 0
HEARTBURN: 0
EYE PAIN: 0

## 2020-11-05 ASSESSMENT — ACTIVITIES OF DAILY LIVING (ADL): CURRENT_FUNCTION: NO ASSISTANCE NEEDED

## 2020-11-06 ENCOUNTER — OFFICE VISIT (OUTPATIENT)
Dept: INTERNAL MEDICINE | Facility: CLINIC | Age: 70
End: 2020-11-06
Payer: COMMERCIAL

## 2020-11-06 VITALS
HEIGHT: 66 IN | BODY MASS INDEX: 29.43 KG/M2 | SYSTOLIC BLOOD PRESSURE: 138 MMHG | DIASTOLIC BLOOD PRESSURE: 86 MMHG | RESPIRATION RATE: 14 BRPM | TEMPERATURE: 97.4 F | OXYGEN SATURATION: 96 % | WEIGHT: 183.1 LBS | HEART RATE: 82 BPM

## 2020-11-06 DIAGNOSIS — N40.1 BENIGN PROSTATIC HYPERPLASIA WITH URINARY HESITANCY: ICD-10-CM

## 2020-11-06 DIAGNOSIS — E78.5 HYPERLIPIDEMIA LDL GOAL <130: ICD-10-CM

## 2020-11-06 DIAGNOSIS — Z23 NEED FOR PROPHYLACTIC VACCINATION AND INOCULATION AGAINST INFLUENZA: ICD-10-CM

## 2020-11-06 DIAGNOSIS — Z00.00 ENCOUNTER FOR MEDICARE ANNUAL WELLNESS EXAM: Primary | ICD-10-CM

## 2020-11-06 DIAGNOSIS — Z23 NEED FOR VACCINATION: ICD-10-CM

## 2020-11-06 DIAGNOSIS — I10 ESSENTIAL HYPERTENSION: ICD-10-CM

## 2020-11-06 DIAGNOSIS — Z12.5 SCREENING FOR PROSTATE CANCER: ICD-10-CM

## 2020-11-06 DIAGNOSIS — R39.11 BENIGN PROSTATIC HYPERPLASIA WITH URINARY HESITANCY: ICD-10-CM

## 2020-11-06 LAB
ALBUMIN SERPL-MCNC: 3.9 G/DL (ref 3.4–5)
ALBUMIN UR-MCNC: NEGATIVE MG/DL
ALP SERPL-CCNC: 77 U/L (ref 40–150)
ALT SERPL W P-5'-P-CCNC: 26 U/L (ref 0–70)
ANION GAP SERPL CALCULATED.3IONS-SCNC: 7 MMOL/L (ref 3–14)
APPEARANCE UR: CLEAR
AST SERPL W P-5'-P-CCNC: 11 U/L (ref 0–45)
BASOPHILS # BLD AUTO: 0.1 10E9/L (ref 0–0.2)
BASOPHILS NFR BLD AUTO: 0.8 %
BILIRUB DIRECT SERPL-MCNC: 0.1 MG/DL (ref 0–0.2)
BILIRUB SERPL-MCNC: 0.6 MG/DL (ref 0.2–1.3)
BILIRUB UR QL STRIP: NEGATIVE
BUN SERPL-MCNC: 28 MG/DL (ref 7–30)
CALCIUM SERPL-MCNC: 9.2 MG/DL (ref 8.5–10.1)
CHLORIDE SERPL-SCNC: 109 MMOL/L (ref 94–109)
CO2 SERPL-SCNC: 24 MMOL/L (ref 20–32)
COLOR UR AUTO: YELLOW
CREAT SERPL-MCNC: 1.29 MG/DL (ref 0.66–1.25)
DIFFERENTIAL METHOD BLD: NORMAL
EOSINOPHIL NFR BLD AUTO: 4.6 %
ERYTHROCYTE [DISTWIDTH] IN BLOOD BY AUTOMATED COUNT: 13.2 % (ref 10–15)
GFR SERPL CREATININE-BSD FRML MDRD: 56 ML/MIN/{1.73_M2}
GLUCOSE SERPL-MCNC: 146 MG/DL (ref 70–99)
GLUCOSE UR STRIP-MCNC: NEGATIVE MG/DL
HCT VFR BLD AUTO: 44.3 % (ref 40–53)
HGB BLD-MCNC: 14.8 G/DL (ref 13.3–17.7)
HGB UR QL STRIP: NEGATIVE
IMM GRANULOCYTES # BLD: 0 10E9/L (ref 0–0.4)
IMM GRANULOCYTES NFR BLD: 0.3 %
KETONES UR STRIP-MCNC: NEGATIVE MG/DL
LEUKOCYTE ESTERASE UR QL STRIP: NEGATIVE
LYMPHOCYTES # BLD AUTO: 1.8 10E9/L (ref 0.8–5.3)
LYMPHOCYTES NFR BLD AUTO: 27.9 %
MCH RBC QN AUTO: 30.8 PG (ref 26.5–33)
MCHC RBC AUTO-ENTMCNC: 33.4 G/DL (ref 31.5–36.5)
MCV RBC AUTO: 92 FL (ref 78–100)
MONOCYTES # BLD AUTO: 0.7 10E9/L (ref 0–1.3)
MONOCYTES NFR BLD AUTO: 11 %
NEUTROPHILS # BLD AUTO: 3.5 10E9/L (ref 1.6–8.3)
NEUTROPHILS NFR BLD AUTO: 55.4 %
NITRATE UR QL: NEGATIVE
NRBC # BLD AUTO: 0 10*3/UL
NRBC BLD AUTO-RTO: 0 /100
PH UR STRIP: 6 PH (ref 5–7)
PLATELET # BLD AUTO: 170 10E9/L (ref 150–450)
POTASSIUM SERPL-SCNC: 4.4 MMOL/L (ref 3.4–5.3)
PROT SERPL-MCNC: 7.5 G/DL (ref 6.8–8.8)
PSA SERPL-ACNC: 6.31 UG/L (ref 0–4)
RBC # BLD AUTO: 4.81 10E12/L (ref 4.4–5.9)
SODIUM SERPL-SCNC: 140 MMOL/L (ref 133–144)
SOURCE: NORMAL
SP GR UR STRIP: 1.01 (ref 1–1.03)
UROBILINOGEN UR STRIP-MCNC: 0 MG/DL (ref 0–2)
WBC # BLD AUTO: 6.4 10E9/L (ref 4–11)

## 2020-11-06 PROCEDURE — 80076 HEPATIC FUNCTION PANEL: CPT | Performed by: INTERNAL MEDICINE

## 2020-11-06 PROCEDURE — 81003 URINALYSIS AUTO W/O SCOPE: CPT | Performed by: INTERNAL MEDICINE

## 2020-11-06 PROCEDURE — 85025 COMPLETE CBC W/AUTO DIFF WBC: CPT | Performed by: INTERNAL MEDICINE

## 2020-11-06 PROCEDURE — 90472 IMMUNIZATION ADMIN EACH ADD: CPT | Performed by: INTERNAL MEDICINE

## 2020-11-06 PROCEDURE — 90471 IMMUNIZATION ADMIN: CPT | Performed by: INTERNAL MEDICINE

## 2020-11-06 PROCEDURE — 36415 COLL VENOUS BLD VENIPUNCTURE: CPT | Performed by: INTERNAL MEDICINE

## 2020-11-06 PROCEDURE — 99397 PER PM REEVAL EST PAT 65+ YR: CPT | Mod: 25 | Performed by: INTERNAL MEDICINE

## 2020-11-06 PROCEDURE — G0103 PSA SCREENING: HCPCS | Performed by: INTERNAL MEDICINE

## 2020-11-06 PROCEDURE — 90750 HZV VACC RECOMBINANT IM: CPT | Performed by: INTERNAL MEDICINE

## 2020-11-06 PROCEDURE — 90662 IIV NO PRSV INCREASED AG IM: CPT | Performed by: INTERNAL MEDICINE

## 2020-11-06 PROCEDURE — 80048 BASIC METABOLIC PNL TOTAL CA: CPT | Performed by: INTERNAL MEDICINE

## 2020-11-06 RX ORDER — LOSARTAN POTASSIUM 100 MG/1
100 TABLET ORAL DAILY
Qty: 90 TABLET | Refills: 3 | Status: SHIPPED | OUTPATIENT
Start: 2020-11-06 | End: 2021-10-07

## 2020-11-06 RX ORDER — FINASTERIDE 5 MG/1
5 TABLET, FILM COATED ORAL DAILY
Qty: 90 TABLET | Refills: 3 | Status: SHIPPED | OUTPATIENT
Start: 2020-11-06 | End: 2021-10-07

## 2020-11-06 RX ORDER — TAMSULOSIN HYDROCHLORIDE 0.4 MG/1
0.4 CAPSULE ORAL DAILY
Qty: 90 CAPSULE | Refills: 3 | Status: SHIPPED | OUTPATIENT
Start: 2020-11-06 | End: 2021-10-07

## 2020-11-06 ASSESSMENT — ACTIVITIES OF DAILY LIVING (ADL): CURRENT_FUNCTION: NO ASSISTANCE NEEDED

## 2020-11-06 ASSESSMENT — ENCOUNTER SYMPTOMS
DYSURIA: 0
NERVOUS/ANXIOUS: 0
ABDOMINAL PAIN: 0
HEADACHES: 0
HEARTBURN: 0
CHILLS: 0
MYALGIAS: 0
HEMATURIA: 0
SHORTNESS OF BREATH: 0
FREQUENCY: 0
NAUSEA: 0
JOINT SWELLING: 0
HEMATOCHEZIA: 0
PARESTHESIAS: 0
PALPITATIONS: 0
CONSTIPATION: 0
EYE PAIN: 0
DIZZINESS: 0
ARTHRALGIAS: 1
WEAKNESS: 0
FEVER: 0
COUGH: 0
DIARRHEA: 0
SORE THROAT: 0

## 2020-11-06 ASSESSMENT — MIFFLIN-ST. JEOR: SCORE: 1543.29

## 2020-11-06 ASSESSMENT — PAIN SCALES - GENERAL: PAINLEVEL: NO PAIN (0)

## 2020-11-06 NOTE — PROGRESS NOTES
"SUBJECTIVE:   Terrence Montoya is a 70 year old male who presents for Preventive Visit.      Patient has been advised of split billing requirements and indicates understanding: Yes   Are you in the first 12 months of your Medicare coverage?  No    Healthy Habits:     In general, how would you rate your overall health?  Excellent    Frequency of exercise:  2-3 days/week    Duration of exercise:  15-30 minutes    Do you usually eat at least 4 servings of fruit and vegetables a day, include whole grains    & fiber and avoid regularly eating high fat or \"junk\" foods?  No    Taking medications regularly:  Yes    Ability to successfully perform activities of daily living:  No assistance needed    Home Safety:  Lack of grab bars in the bathroom    Hearing Impairment:  Difficulty following a conversation in a noisy restaurant or crowded room, feel that people are mumbling or not speaking clearly, difficulty following dialogue in the theater, difficult to understand a speaker at a public meeting or Gn[employer] service, need to ask people to speak up or repeat themselves, find that men's voices are easier to understand than woman's, difficulty understanding soft or whispered speech and difficulty understanding speech on the telephone    In the past 6 months, have you been bothered by leaking of urine?  No    In general, how would you rate your overall mental or emotional health?  Excellent      PHQ-2 Total Score: 0    Additional concerns today:  No    Do you feel safe in your environment? Yes    Have you ever done Advance Care Planning? (For example, a Health Directive, POLST, or a discussion with a medical provider or your loved ones about your wishes): No, advance care planning information given to patient to review.  Patient plans to discuss their wishes with loved ones or provider.        Fall risk  Fallen 2 or more times in the past year?: No  Any fall with injury in the past year?: No    Cognitive Screening   1) Repeat 3 " items (Leader, Season, Table)    2) Clock draw: NORMAL  3) 3 item recall: Recalls 3 objects  Results: 3 items recalled: COGNITIVE IMPAIRMENT LESS LIKELY    Mini-CogTM Copyright CARLA Elias. Licensed by the author for use in [facility]; reprinted with permission ([email or web address]). All rights reserved.      Do you have sleep apnea, excessive snoring or daytime drowsiness?: no    Reviewed and updated as needed this visit by clinical staff  Tobacco  Allergies  Meds   Med Hx  Surg Hx  Fam Hx  Soc Hx        Reviewed and updated as needed this visit by Provider                Social History     Tobacco Use     Smoking status: Never Smoker     Smokeless tobacco: Never Used   Substance Use Topics     Alcohol use: No     Alcohol/week: 0.0 standard drinks     If you drink alcohol do you typically have >3 drinks per day or >7 drinks per week? No    Alcohol Use 11/5/2020   Prescreen: >3 drinks/day or >7 drinks/week? No   Prescreen: >3 drinks/day or >7 drinks/week? -   No flowsheet data found.        -------------------------------------    Current providers sharing in care for this patient include:   Patient Care Team:  Jem Casper DO as PCP - General (Internal Medicine)  Jem Casper DO as Assigned PCP    The following health maintenance items are reviewed in Epic and correct as of today:  Health Maintenance   Topic Date Due     ADVANCE CARE PLANNING  1950     ZOSTER IMMUNIZATION (2 of 3) 01/03/2017     AORTIC ANEURYSM SCREENING (SYSTEM ASSIGNED)  05/15/2017     FALL RISK ASSESSMENT  12/14/2019     INFLUENZA VACCINE (1) 09/01/2020     MEDICARE ANNUAL WELLNESS VISIT  11/06/2021     DTAP/TDAP/TD IMMUNIZATION (2 - Td) 11/30/2021     LIPID  03/15/2024     COLORECTAL CANCER SCREENING  02/27/2027     HEPATITIS C SCREENING  Completed     PHQ-2  Completed     Pneumococcal Vaccine: 65+ Years  Completed     Pneumococcal Vaccine: Pediatrics (0 to 5 Years) and At-Risk Patients (6 to 64  Years)  Aged Out     IPV IMMUNIZATION  Aged Out     MENINGITIS IMMUNIZATION  Aged Out     HEPATITIS B IMMUNIZATION  Aged Out     Lab work is in process  Labs reviewed in EPIC  BP Readings from Last 3 Encounters:   11/06/20 138/86   08/26/19 (!) 159/93   03/15/19 170/90    Wt Readings from Last 3 Encounters:   11/06/20 83.1 kg (183 lb 1.6 oz)   03/15/19 88 kg (194 lb)   12/14/18 87.3 kg (192 lb 8 oz)                  Patient Active Problem List   Diagnosis     Elevated blood pressure     Past Surgical History:   Procedure Laterality Date     COLONOSCOPY N/A 2/27/2017    Procedure: COMBINED COLONOSCOPY, SINGLE OR MULTIPLE BIOPSY/POLYPECTOMY BY BIOPSY;  Surgeon: Armani Jacques MD;  Location:  GI       Social History     Tobacco Use     Smoking status: Never Smoker     Smokeless tobacco: Never Used   Substance Use Topics     Alcohol use: No     Alcohol/week: 0.0 standard drinks     Family History   Problem Relation Age of Onset     Other Cancer Mother         Lung     Other Cancer Father         Brain     Prostate Cancer Paternal Grandfather      Other Cancer Brother         Testicular     Anesthesia Reaction No family hx of          Current Outpatient Medications   Medication Sig Dispense Refill     finasteride (PROSCAR) 5 MG tablet Take 1 tablet (5 mg) by mouth daily 90 tablet 3     lisinopril (ZESTRIL) 10 MG tablet Take 1 tablet (10 mg) by mouth daily 90 tablet 0     losartan (COZAAR) 100 MG tablet Take 1 tablet (100 mg) by mouth daily 90 tablet 3     tamsulosin (FLOMAX) 0.4 MG capsule Take 1 capsule (0.4 mg) by mouth daily 90 capsule 3     Allergies   Allergen Reactions     Nkda [No Known Drug Allergies]          Review of Systems   Constitutional: Negative for chills and fever.   HENT: Positive for hearing loss. Negative for congestion, ear pain and sore throat.    Eyes: Negative for pain and visual disturbance.   Respiratory: Negative for cough and shortness of breath.    Cardiovascular: Negative for  "chest pain, palpitations and peripheral edema.   Gastrointestinal: Negative for abdominal pain, constipation, diarrhea, heartburn, hematochezia and nausea.   Genitourinary: Positive for impotence. Negative for discharge, dysuria, frequency, genital sores, hematuria and urgency.   Musculoskeletal: Positive for arthralgias. Negative for joint swelling and myalgias.   Skin: Negative for rash.   Neurological: Negative for dizziness, weakness, headaches and paresthesias.   Psychiatric/Behavioral: Negative for mood changes. The patient is not nervous/anxious.      CONSTITUTIONAL: NEGATIVE for fever, chills, change in weight  INTEGUMENTARY/SKIN: NEGATIVE for worrisome rashes, moles or lesions  EYES: NEGATIVE for vision changes or irritation  ENT/MOUTH: NEGATIVE for ear, mouth and throat problems patient has some difficulty with some discrimination.  RESP: NEGATIVE for significant cough or SOB  BREAST: NEGATIVE for masses, tenderness or discharge  CV: NEGATIVE for chest pain, palpitations or peripheral edema  GI: NEGATIVE for nausea, abdominal pain, heartburn, or change in bowel habits  : NEGATIVE for negative for hematuria.  Patient does have some hesitancy and frequency at night.  Was previously on medications for BPH and had good results.  Patient has intermittent erectile dysfunction.  MUSCULOSKELETAL: NEGATIVE for significant arthralgias or myalgia  NEURO: NEGATIVE for weakness, dizziness or paresthesias  ENDOCRINE: NEGATIVE for temperature intolerance, skin/hair changes  HEME: NEGATIVE for bleeding problems  PSYCHIATRIC: NEGATIVE for changes in mood or affect    OBJECTIVE:   /86 (BP Location: Right arm, Patient Position: Sitting, Cuff Size: Adult Regular)   Pulse 82   Temp 97.4  F (36.3  C) (Temporal)   Resp 14   Ht 1.676 m (5' 6\")   Wt 83.1 kg (183 lb 1.6 oz)   SpO2 96%   BMI 29.55 kg/m   Estimated body mass index is 29.55 kg/m  as calculated from the following:    Height as of this encounter: 1.676 m " "(5' 6\").    Weight as of this encounter: 83.1 kg (183 lb 1.6 oz).  Physical Exam  GENERAL: healthy, alert and no distress  EYES: Eyes grossly normal to inspection, PERRL and conjunctivae and sclerae normal  HENT: ear canals and TM's normal, nose and mouth without ulcers or lesions  NECK: no adenopathy, no asymmetry, masses, or scars and thyroid normal to palpation  RESP: lungs clear to auscultation - no rales, rhonchi or wheezes  CV: regular rate and rhythm, normal S1 S2, no S3 or S4, no murmur, click or rub, no peripheral edema and peripheral pulses strong  ABDOMEN: soft, nontender, no hepatosplenomegaly, no masses and bowel sounds normal  MS: no gross musculoskeletal defects noted, no edema  SKIN: no suspicious lesions or rashes  NEURO: Normal strength and tone, mentation intact and speech normal  PSYCH: mentation appears normal, affect normal/bright    Diagnostic Test Results:  No results found for this or any previous visit (from the past 24 hour(s)).    ASSESSMENT / PLAN:       ICD-10-CM    1. Encounter for Medicare annual wellness exam  Z00.00 FLUZONE HIGH DOSE 65+  [42948]   2. Essential hypertension  I10 Basic metabolic panel  (Ca, Cl, CO2, Creat, Gluc, K, Na, BUN)     losartan (COZAAR) 100 MG tablet     *UA reflex to Microscopic and Culture ([place]; [facility]; [facility]; [facility]; [facility]; [facility]; [place]; [place])   3. Benign prostatic hyperplasia with urinary hesitancy  N40.1 PSA, screen    R39.11 CBC with platelets and differential     tamsulosin (FLOMAX) 0.4 MG capsule     finasteride (PROSCAR) 5 MG tablet   4. Hyperlipidemia LDL goal <130  E78.5 Hepatic panel   5. Screening for prostate cancer  Z12.5        Patient has been advised of split billing requirements and indicates understanding: Yes  COUNSELING:  Reviewed preventive health counseling, as reflected in patient instructions       Regular exercise       Healthy diet/nutrition       Vision screening       Hearing " "screening       Dental care       Colon cancer screening       Prostate cancer screening    Estimated body mass index is 29.55 kg/m  as calculated from the following:    Height as of this encounter: 1.676 m (5' 6\").    Weight as of this encounter: 83.1 kg (183 lb 1.6 oz).        He reports that he has never smoked. He has never used smokeless tobacco.      Appropriate preventive services were discussed with this patient, including applicable screening as appropriate for cardiovascular disease, diabetes, osteopenia/osteoporosis, and glaucoma.  As appropriate for age/gender, discussed screening for colorectal cancer, prostate cancer, breast cancer, and cervical cancer. Checklist reviewing preventive services available has been given to the patient.    Reviewed patients plan of care and provided an AVS. The Basic Care Plan (routine screening as documented in Health Maintenance) for Terrence meets the Care Plan requirement. This Care Plan has been established and reviewed with the Patient.    Counseling Resources:  ATP IV Guidelines  Pooled Cohorts Equation Calculator  Breast Cancer Risk Calculator  Breast Cancer: Medication to Reduce Risk  FRAX Risk Assessment  ICSI Preventive Guidelines  Dietary Guidelines for Americans, 2010  USDA's MyPlate  ASA Prophylaxis  Lung CA Screening    Jem Casper DO  [facility]    Identified Health Risks:    The patient was counseled and encouraged to consider modifying their diet and eating habits. He was provided with information on recommended healthy diet options.  The patient was provided with written information regarding signs of hearing loss.  "

## 2020-11-06 NOTE — PATIENT INSTRUCTIONS
Patient Education   Personalized Prevention Plan  You are due for the preventive services outlined below.  Your care team is available to assist you in scheduling these services.  If you have already completed any of these items, please share that information with your care team to update in your medical record.  Health Maintenance Due   Topic Date Due     Discuss Advance Care Planning  1950     Zoster (Shingles) Vaccine (2 of 3) 01/03/2017     AORTIC ANEURYSM SCREENING (SYSTEM ASSIGNED)  05/15/2017     FALL RISK ASSESSMENT  12/14/2019     Flu Vaccine (1) 09/01/2020       Understanding RatingBug MyPlate  The USDA (U.S. Department of Agriculture) has guidelines to help you make healthy food choices. These are called MyPlate. MyPlate shows the food groups that make up healthy meals using the image of a place setting. Before you eat, think about the healthiest choices for what to put onto your plate or into your cup or bowl. To learn more about building a healthy plate, visit www.choosemyplate.gov.    The food groups    Fruits. Any fruit or 100% fruit juice counts as part of the Fruit Group. Fruits may be fresh, canned, frozen, or dried, and may be whole, cut-up, or pureed. Make half your plate fruits and vegetables.    Vegetables. Any vegetable or 100% vegetable juice counts as a member of the Vegetable Group. Vegetables may be fresh, frozen, canned, or dried. They can be served raw or cooked and may be whole, cut-up, or mashed. Make half your plate fruits and vegetables.    Grains. All foods made from grains are part of the Grains Group. These include wheat, rice, oats, cornmeal, and barley such as bread, pasta, oatmeal, cereal, tortillas, and grits. Grains should be no more than a quarter of your plate. At least half of your grains should be whole grains.    Protein. This group includes meat, poultry, seafood, beans and peas, eggs, processed soy products (like tofu), nuts (including nut butters), and seeds. Make  protein choices no more than a quarter of your plate. Meat and poultry choices should be lean or low fat.    Dairy. All fluid milk products and foods made from milk that contain calcium, like yogurt and cheese, are part of the Dairy Group. (Foods that have little calcium, such as cream, butter, and cream cheese, are not part of the group.) Most dairy choices should be low-fat or fat-free.    Oils. These are fats that are liquid at room temperature. They include canola, corn, olive, soybean, and sunflower oil. Foods that are mainly oil include mayonnaise, certain salad dressings, and soft margarines. You should have only 5 to 7 teaspoons of oils a day. You probably already get this much from the food you eat.  [employer] last reviewed this educational content on 8/1/2017 2000-2020 [employer] [address] [place] [address]. All rights reserved. This information is not intended as a substitute for professional medical care. Always follow your healthcare professional's instructions.          Signs of Hearing Loss      Hearing much better with one ear can be a sign of hearing loss.   Hearing loss is a problem shared by many people. In fact, it is one of the most common health problems, particularly as people age. Most people age 65 and older have some hearing loss. By age 80, almost everyone does. Hearing loss often occurs slowly over the years. So you may not realize your hearing has gotten worse.  Have your hearing checked  Call your healthcare provider if you:    Have to strain to hear normal conversation    Have to watch other people s faces very carefully to follow what they re saying    Need to ask people to repeat what they ve said    Often misunderstand what people are saying    Turn the volume of the television or radio up so high that others complain    Feel that people are mumbling when they re talking to you    Find that the effort to hear leaves you feeling tired and  irritated    Notice, when using the phone, that you hear better with one ear than the other  [employer] last reviewed this educational content on 1/1/2020 2000-2020 [employer]. [address] [place] [address], [place] [address]. All rights reserved. This information is not intended as a substitute for professional medical care. Always follow your healthcare professional's instructions.

## 2020-11-07 NOTE — RESULT ENCOUNTER NOTE
Dear Terrence, your recent test results are attached.    The urine sample is negative.  The PSA is a slightly elevated but nonprogressive.  I would recommend checking this on a semiannual basis.  Liver tests are normal.  The chemistry panel shows an elevated blood sugar of 146 and minimally decreased but stable renal function.  Blood cell count is normal without evidence of anemia or leukemia.    You will be contacted with any outstanding results when they are available.  Feel free to contact me via the office or My Chart if you have any questions regarding the above.  Sincerely,  Jem Casper DO [facility]

## 2021-10-07 ENCOUNTER — OFFICE VISIT (OUTPATIENT)
Dept: INTERNAL MEDICINE | Facility: CLINIC | Age: 71
End: 2021-10-07
Payer: MEDICARE

## 2021-10-07 VITALS
DIASTOLIC BLOOD PRESSURE: 82 MMHG | RESPIRATION RATE: 18 BRPM | BODY MASS INDEX: 29.01 KG/M2 | WEIGHT: 180.5 LBS | TEMPERATURE: 97.3 F | OXYGEN SATURATION: 96 % | HEART RATE: 65 BPM | SYSTOLIC BLOOD PRESSURE: 104 MMHG | HEIGHT: 66 IN

## 2021-10-07 DIAGNOSIS — R39.11 BENIGN PROSTATIC HYPERPLASIA WITH URINARY HESITANCY: ICD-10-CM

## 2021-10-07 DIAGNOSIS — I10 ESSENTIAL HYPERTENSION: ICD-10-CM

## 2021-10-07 DIAGNOSIS — Z12.5 SCREENING FOR PROSTATE CANCER: ICD-10-CM

## 2021-10-07 DIAGNOSIS — E78.5 HYPERLIPIDEMIA LDL GOAL <130: ICD-10-CM

## 2021-10-07 DIAGNOSIS — Z23 NEED FOR INFLUENZA VACCINATION: ICD-10-CM

## 2021-10-07 DIAGNOSIS — Z00.00 ENCOUNTER FOR MEDICARE ANNUAL WELLNESS EXAM: Primary | ICD-10-CM

## 2021-10-07 DIAGNOSIS — N40.1 BENIGN PROSTATIC HYPERPLASIA WITH URINARY HESITANCY: ICD-10-CM

## 2021-10-07 PROBLEM — E11.9 DIABETES MELLITUS, TYPE 2 (H): Status: ACTIVE | Noted: 2021-10-07

## 2021-10-07 LAB
ALBUMIN UR-MCNC: NEGATIVE MG/DL
ANION GAP SERPL CALCULATED.3IONS-SCNC: 4 MMOL/L (ref 3–14)
APPEARANCE UR: CLEAR
BASOPHILS # BLD AUTO: 0.1 10E3/UL (ref 0–0.2)
BASOPHILS NFR BLD AUTO: 1 %
BILIRUB UR QL STRIP: NEGATIVE
BUN SERPL-MCNC: 24 MG/DL (ref 7–30)
CALCIUM SERPL-MCNC: 8.4 MG/DL (ref 8.5–10.1)
CHLORIDE BLD-SCNC: 111 MMOL/L (ref 94–109)
CO2 SERPL-SCNC: 27 MMOL/L (ref 20–32)
COLOR UR AUTO: YELLOW
CREAT SERPL-MCNC: 1.39 MG/DL (ref 0.66–1.25)
EOSINOPHIL # BLD AUTO: 0.3 10E3/UL (ref 0–0.7)
EOSINOPHIL NFR BLD AUTO: 4 %
ERYTHROCYTE [DISTWIDTH] IN BLOOD BY AUTOMATED COUNT: 13.2 % (ref 10–15)
GFR SERPL CREATININE-BSD FRML MDRD: 51 ML/MIN/1.73M2
GLUCOSE BLD-MCNC: 177 MG/DL (ref 70–99)
GLUCOSE UR STRIP-MCNC: 150 MG/DL
HCT VFR BLD AUTO: 41.5 % (ref 40–53)
HGB BLD-MCNC: 13.7 G/DL (ref 13.3–17.7)
HGB UR QL STRIP: NEGATIVE
IMM GRANULOCYTES # BLD: 0 10E3/UL
IMM GRANULOCYTES NFR BLD: 0 %
KETONES UR STRIP-MCNC: NEGATIVE MG/DL
LEUKOCYTE ESTERASE UR QL STRIP: NEGATIVE
LYMPHOCYTES # BLD AUTO: 2.3 10E3/UL (ref 0.8–5.3)
LYMPHOCYTES NFR BLD AUTO: 32 %
MCH RBC QN AUTO: 30.6 PG (ref 26.5–33)
MCHC RBC AUTO-ENTMCNC: 33 G/DL (ref 31.5–36.5)
MCV RBC AUTO: 93 FL (ref 78–100)
MONOCYTES # BLD AUTO: 0.8 10E3/UL (ref 0–1.3)
MONOCYTES NFR BLD AUTO: 11 %
NEUTROPHILS # BLD AUTO: 3.7 10E3/UL (ref 1.6–8.3)
NEUTROPHILS NFR BLD AUTO: 52 %
NITRATE UR QL: NEGATIVE
NRBC # BLD AUTO: 0 10E3/UL
NRBC BLD AUTO-RTO: 0 /100
PH UR STRIP: 6 [PH] (ref 5–7)
PLATELET # BLD AUTO: 182 10E3/UL (ref 150–450)
POTASSIUM BLD-SCNC: 4.9 MMOL/L (ref 3.4–5.3)
PSA SERPL-MCNC: 5.03 UG/L (ref 0–4)
RBC # BLD AUTO: 4.47 10E6/UL (ref 4.4–5.9)
SODIUM SERPL-SCNC: 142 MMOL/L (ref 133–144)
SP GR UR STRIP: 1.02 (ref 1–1.03)
UROBILINOGEN UR STRIP-MCNC: NORMAL MG/DL
WBC # BLD AUTO: 7.1 10E3/UL (ref 4–11)

## 2021-10-07 PROCEDURE — 81003 URINALYSIS AUTO W/O SCOPE: CPT | Performed by: INTERNAL MEDICINE

## 2021-10-07 PROCEDURE — 36415 COLL VENOUS BLD VENIPUNCTURE: CPT | Performed by: INTERNAL MEDICINE

## 2021-10-07 PROCEDURE — 85025 COMPLETE CBC W/AUTO DIFF WBC: CPT | Performed by: INTERNAL MEDICINE

## 2021-10-07 PROCEDURE — 80048 BASIC METABOLIC PNL TOTAL CA: CPT | Performed by: INTERNAL MEDICINE

## 2021-10-07 PROCEDURE — G0103 PSA SCREENING: HCPCS | Performed by: INTERNAL MEDICINE

## 2021-10-07 PROCEDURE — 99397 PER PM REEVAL EST PAT 65+ YR: CPT | Performed by: INTERNAL MEDICINE

## 2021-10-07 RX ORDER — LOSARTAN POTASSIUM 100 MG/1
100 TABLET ORAL DAILY
Qty: 90 TABLET | Refills: 3 | Status: SHIPPED | OUTPATIENT
Start: 2021-10-07 | End: 2022-01-31

## 2021-10-07 RX ORDER — NAPROXEN 500 MG/1
500 TABLET ORAL
COMMUNITY

## 2021-10-07 RX ORDER — TAMSULOSIN HYDROCHLORIDE 0.4 MG/1
0.4 CAPSULE ORAL DAILY
Qty: 90 CAPSULE | Refills: 3 | Status: SHIPPED | OUTPATIENT
Start: 2021-10-07 | End: 2022-01-31

## 2021-10-07 RX ORDER — FINASTERIDE 5 MG/1
5 TABLET, FILM COATED ORAL DAILY
Qty: 90 TABLET | Refills: 3 | Status: SHIPPED | OUTPATIENT
Start: 2021-10-07 | End: 2022-01-31

## 2021-10-07 ASSESSMENT — MIFFLIN-ST. JEOR: SCORE: 1523.74

## 2021-10-07 ASSESSMENT — PAIN SCALES - GENERAL: PAINLEVEL: NO PAIN (0)

## 2021-10-07 NOTE — PROGRESS NOTES
"  SUBJECTIVE:   Terrence Montoya is a 71 year old male who presents for Preventive Visit.      Patient has been advised of split billing requirements and indicates understanding: Yes  Are you in the first 12 months of your Medicare Part B coverage?  No    Physical Health:    In general, how would you rate your overall physical health? excellent    Outside of work, how many days during the week do you exercise? 2-3 days/week    Outside of work, approximately how many minutes a day do you exercise?15-30 minutes    If you drink alcohol do you typically have >3 drinks per day or >7 drinks per week? No    Do you usually eat at least 4 servings of fruit and vegetables a day, include whole grains & fiber and avoid regularly eating high fat or \"junk\" foods? Yes    Do you have any problems taking medications regularly?  No    Do you have any side effects from medications? none    Needs assistance for the following daily activities: no assistance needed    Which of the following safety concerns are present in your home?  none identified     Hearing impairment: No    In the past 6 months, have you been bothered by leaking of urine? no    Mental Health:    In general, how would you rate your overall mental or emotional health? excellent  PHQ-2 Score: 0    Do you feel safe in your environment? Yes    Have you ever done Advance Care Planning? (For example, a Health Directive, POLST, or a discussion with a medical provider or your loved ones about your wishes): Note, patient is going to think about it    Additional concerns to address?  No    Fall risk:  Fallen 2 or more times in the past year?: No  Any fall with injury in the past year?: No    Cognitive Screenin) Repeat 3 items (Leader, Season, Table)    2) Clock draw: NORMAL  3) 3 item recall: Recalls 3 objects  Results: 3 items recalled: COGNITIVE IMPAIRMENT LESS LIKELY    Mini-CogTM Copyright S Jada. Licensed by the author for use in [facility]; reprinted " with permission (sirisha@[facility]). All rights reserved.      Do you have sleep apnea, excessive snoring or daytime drowsiness?: no        -------------------------------------    Reviewed and updated as needed this visit by clinical staff  Tobacco  Allergies  Meds   Med Hx  Surg Hx  Fam Hx  Soc Hx        Reviewed and updated as needed this visit by Provider                Social History     Tobacco Use     Smoking status: Never Smoker     Smokeless tobacco: Never Used   Substance Use Topics     Alcohol use: No     Alcohol/week: 0.0 standard drinks                           Current providers sharing in care for this patient include:   Patient Care Team:  Jem Casper DO as PCP - General (Internal Medicine)  Jem Casper DO as Assigned PCP    The following health maintenance items are reviewed in Epic and correct as of today:  Health Maintenance   Topic Date Due     ANNUAL REVIEW OF HM ORDERS  Never done     AORTIC ANEURYSM SCREENING (SYSTEM ASSIGNED)  Never done     INFLUENZA VACCINE (1) 09/01/2021     ZOSTER IMMUNIZATION (3 of 3) 01/01/2021     FALL RISK ASSESSMENT  11/06/2021     DTAP/TDAP/TD IMMUNIZATION (2 - Td or Tdap) 11/30/2021     MEDICARE ANNUAL WELLNESS VISIT  10/07/2022     LIPID  03/15/2024     ADVANCE CARE PLANNING  10/07/2026     COLORECTAL CANCER SCREENING  02/27/2027     HEPATITIS C SCREENING  Completed     PHQ-2  Completed     Pneumococcal Vaccine: 65+ Years  Completed     COVID-19 Vaccine  Completed     IPV IMMUNIZATION  Aged Out     MENINGITIS IMMUNIZATION  Aged Out     HEPATITIS B IMMUNIZATION  Aged Out     Lab work is in process  Labs reviewed in EPIC  BP Readings from Last 3 Encounters:   10/07/21 104/82   11/06/20 138/86   08/26/19 (!) 159/93    Wt Readings from Last 3 Encounters:   10/07/21 81.9 kg (180 lb 8 oz)   11/06/20 83.1 kg (183 lb 1.6 oz)   03/15/19 88 kg (194 lb)                  Patient Active Problem List   Diagnosis     Elevated blood pressure      Diabetes mellitus, type 2 (H)     Past Surgical History:   Procedure Laterality Date     COLONOSCOPY N/A 2/27/2017    Procedure: COMBINED COLONOSCOPY, SINGLE OR MULTIPLE BIOPSY/POLYPECTOMY BY BIOPSY;  Surgeon: Armani Jacques MD;  Location:  GI       Social History     Tobacco Use     Smoking status: Never Smoker     Smokeless tobacco: Never Used   Substance Use Topics     Alcohol use: No     Alcohol/week: 0.0 standard drinks     Family History   Problem Relation Age of Onset     Other Cancer Mother         Lung     Other Cancer Father         Brain     Prostate Cancer Paternal Grandfather      Other Cancer Brother         Testicular     Anesthesia Reaction No family hx of          Current Outpatient Medications   Medication Sig Dispense Refill     finasteride (PROSCAR) 5 MG tablet Take 1 tablet (5 mg) by mouth daily 90 tablet 3     losartan (COZAAR) 100 MG tablet Take 1 tablet (100 mg) by mouth daily 90 tablet 3     naproxen (NAPROSYN) 500 MG tablet Take 500 mg by mouth       tamsulosin (FLOMAX) 0.4 MG capsule Take 1 capsule (0.4 mg) by mouth daily 90 capsule 3     Allergies   Allergen Reactions     Nkda [No Known Drug Allergies]          ROS:  CONSTITUTIONAL: NEGATIVE for fever, chills, change in weight  INTEGUMENTARY/SKIN: NEGATIVE for worrisome rashes, moles or lesions  EYES: NEGATIVE for vision changes or irritation  ENT/MOUTH: NEGATIVE for ear, mouth and throat problems  RESP: NEGATIVE for significant cough or SOB  BREAST: NEGATIVE for masses, tenderness or discharge  CV: NEGATIVE for chest pain, palpitations or peripheral edema  GI: NEGATIVE for nausea, abdominal pain, heartburn, or change in bowel habits  : NEGATIVE for frequency, dysuria, or hematuria  MUSCULOSKELETAL: NEGATIVE for significant arthralgias or myalgia  NEURO: NEGATIVE for weakness, dizziness or paresthesias  ENDOCRINE: NEGATIVE for temperature intolerance, skin/hair changes  HEME: NEGATIVE for bleeding problems  PSYCHIATRIC:  "NEGATIVE for changes in mood or affect    OBJECTIVE:   /82 (BP Location: Right arm, Patient Position: Sitting)   Pulse 65   Temp 97.3  F (36.3  C) (Temporal)   Resp 18   Ht 1.672 m (5' 5.83\")   Wt 81.9 kg (180 lb 8 oz)   SpO2 96%   BMI 29.29 kg/m   Estimated body mass index is 29.29 kg/m  as calculated from the following:    Height as of this encounter: 1.672 m (5' 5.83\").    Weight as of this encounter: 81.9 kg (180 lb 8 oz).  EXAM:   GENERAL: healthy, alert and no distress  EYES: Eyes grossly normal to inspection, PERRL and conjunctivae and sclerae normal  HENT: ear canals and TM's normal, nose and mouth without ulcers or lesions  NECK: no adenopathy, no asymmetry, masses, or scars and thyroid normal to palpation  RESP: lungs clear to auscultation - no rales, rhonchi or wheezes  CV: regular rate and rhythm, normal S1 S2, no S3 or S4, no murmur, click or rub, no peripheral edema and peripheral pulses strong  ABDOMEN: soft, nontender, no hepatosplenomegaly, no masses and bowel sounds normal  MS: no gross musculoskeletal defects noted, no edema  SKIN: no suspicious lesions or rashes  NEURO: Normal strength and tone, mentation intact and speech normal  PSYCH: mentation appears normal, affect normal/bright    Diagnostic Test Results:  Labs reviewed in Epic  Results for orders placed or performed in visit on 10/07/21 (from the past 24 hour(s))   PSA, screen   Result Value Ref Range    Prostate Specific Antigen Screen 5.03 (H) 0.00 - 4.00 ug/L   Basic metabolic panel  (Ca, Cl, CO2, Creat, Gluc, K, Na, BUN)   Result Value Ref Range    Sodium 142 133 - 144 mmol/L    Potassium 4.9 3.4 - 5.3 mmol/L    Chloride 111 (H) 94 - 109 mmol/L    Carbon Dioxide (CO2) 27 20 - 32 mmol/L    Anion Gap 4 3 - 14 mmol/L    Urea Nitrogen 24 7 - 30 mg/dL    Creatinine 1.39 (H) 0.66 - 1.25 mg/dL    Calcium 8.4 (L) 8.5 - 10.1 mg/dL    Glucose 177 (H) 70 - 99 mg/dL    GFR Estimate 51 (L) >60 mL/min/1.73m2   CBC with platelets and " differential    Narrative    The following orders were created for panel order CBC with platelets and differential.  Procedure                               Abnormality         Status                     ---------                               -----------         ------                     CBC with platelets and d...[[id number]]                      Final result                 Please view results for these tests on the individual orders.   CBC with platelets and differential   Result Value Ref Range    WBC Count 7.1 4.0 - 11.0 10e3/uL    RBC Count 4.47 4.40 - 5.90 10e6/uL    Hemoglobin 13.7 13.3 - 17.7 g/dL    Hematocrit 41.5 40.0 - 53.0 %    MCV 93 78 - 100 fL    MCH 30.6 26.5 - 33.0 pg    MCHC 33.0 31.5 - 36.5 g/dL    RDW 13.2 10.0 - 15.0 %    Platelet Count 182 150 - 450 10e3/uL    % Neutrophils 52 %    % Lymphocytes 32 %    % Monocytes 11 %    % Eosinophils 4 %    % Basophils 1 %    % Immature Granulocytes 0 %    NRBCs per 100 WBC 0 <1 /100    Absolute Neutrophils 3.7 1.6 - 8.3 10e3/uL    Absolute Lymphocytes 2.3 0.8 - 5.3 10e3/uL    Absolute Monocytes 0.8 0.0 - 1.3 10e3/uL    Absolute Eosinophils 0.3 0.0 - 0.7 10e3/uL    Absolute Basophils 0.1 0.0 - 0.2 10e3/uL    Absolute Immature Granulocytes 0.0 <=0.0 10e3/uL    Absolute NRBCs 0.0 10e3/uL   UA Macro with Reflex to Micro and Culture - lab collect    Specimen: Urine, NOS   Result Value Ref Range    Color Urine Yellow Colorless, Straw, Light Yellow, Yellow    Appearance Urine Clear Clear    Glucose Urine 150  (A) Negative mg/dL    Bilirubin Urine Negative Negative    Ketones Urine Negative Negative mg/dL    Specific Gravity Urine 1.020 1.003 - 1.035    Blood Urine Negative Negative    pH Urine 6.0 5.0 - 7.0    Protein Albumin Urine Negative Negative mg/dL    Urobilinogen Urine Normal Normal, 2.0 mg/dL    Nitrite Urine Negative Negative    Leukocyte Esterase Urine Negative Negative    Narrative    Microscopic not indicated       ASSESSMENT / PLAN:        "ICD-10-CM    1. Encounter for Medicare annual wellness exam  Z00.00 CBC with platelets and differential     CBC with platelets and differential   2. Essential hypertension  I10 Basic metabolic panel  (Ca, Cl, CO2, Creat, Gluc, K, Na, BUN)     UA Macro with Reflex to Micro and Culture - lab collect     losartan (COZAAR) 100 MG tablet     UA Macro with Reflex to Micro and Culture - lab collect     Basic metabolic panel  (Ca, Cl, CO2, Creat, Gluc, K, Na, BUN)   3. Hyperlipidemia LDL goal <130  E78.5    4. Need for influenza vaccination  Z23 CANCELED: INFLUENZA, QUAD, HIGH DOSE, PF, 65YR + (FLUZONE HD)   5. Screening for prostate cancer  Z12.5 PSA, screen     PSA, screen   6. Benign prostatic hyperplasia with urinary hesitancy  N40.1 finasteride (PROSCAR) 5 MG tablet    R39.11 tamsulosin (FLOMAX) 0.4 MG capsule       Patient has been advised of split billing requirements and indicates understanding: Yes    COUNSELING:  Reviewed preventive health counseling, as reflected in patient instructions       Regular exercise       Healthy diet/nutrition       Vision screening       Hearing screening       Dental care    Estimated body mass index is 29.29 kg/m  as calculated from the following:    Height as of this encounter: 1.672 m (5' 5.83\").    Weight as of this encounter: 81.9 kg (180 lb 8 oz).        He reports that he has never smoked. He has never used smokeless tobacco.    Appropriate preventive services were discussed with this patient, including applicable screening as appropriate for cardiovascular disease, diabetes, osteopenia/osteoporosis, and glaucoma.  As appropriate for age/gender, discussed screening for colorectal cancer, prostate cancer, breast cancer, and cervical cancer. Checklist reviewing preventive services available has been given to the patient.    Reviewed patients plan of care and provided an AVS. The Basic Care Plan (routine screening as documented in Health Maintenance) for Terrence meets the Care Plan " requirement. This Care Plan has been established and reviewed with the Patient.    Counseling Resources:  ATP IV Guidelines  Pooled Cohorts Equation Calculator  Breast Cancer Risk Calculator  BRCA-Related Cancer Risk Assessment: FHS-7 Tool  FRAX Risk Assessment  ICSI Preventive Guidelines  Dietary Guidelines for Americans, 2010  USDA's MyPlate  ASA Prophylaxis  Lung CA Screening    DO UNRULY De Oliveira [facility]

## 2021-10-07 NOTE — PATIENT INSTRUCTIONS
Patient Education   Personalized Prevention Plan  You are due for the preventive services outlined below.  Your care team is available to assist you in scheduling these services.  If you have already completed any of these items, please share that information with your care team to update in your medical record.  Health Maintenance Due   Topic Date Due     ANNUAL REVIEW OF HM ORDERS  Never done     AORTIC ANEURYSM SCREENING (SYSTEM ASSIGNED)  Never done     Flu Vaccine (1) 09/01/2021     Zoster (Shingles) Vaccine (3 of 3) 01/01/2021     FALL RISK ASSESSMENT  11/06/2021

## 2021-10-23 ENCOUNTER — HEALTH MAINTENANCE LETTER (OUTPATIENT)
Age: 71
End: 2021-10-23

## 2022-01-28 ENCOUNTER — MYC MEDICAL ADVICE (OUTPATIENT)
Dept: INTERNAL MEDICINE | Facility: CLINIC | Age: 72
End: 2022-01-28
Payer: COMMERCIAL

## 2022-01-28 DIAGNOSIS — I10 ESSENTIAL HYPERTENSION: ICD-10-CM

## 2022-01-28 DIAGNOSIS — R39.11 BENIGN PROSTATIC HYPERPLASIA WITH URINARY HESITANCY: ICD-10-CM

## 2022-01-28 DIAGNOSIS — N40.1 BENIGN PROSTATIC HYPERPLASIA WITH URINARY HESITANCY: ICD-10-CM

## 2022-01-31 RX ORDER — FINASTERIDE 5 MG/1
5 TABLET, FILM COATED ORAL DAILY
Qty: 90 TABLET | Refills: 1 | Status: SHIPPED | OUTPATIENT
Start: 2022-01-31 | End: 2022-11-28

## 2022-01-31 RX ORDER — TAMSULOSIN HYDROCHLORIDE 0.4 MG/1
0.4 CAPSULE ORAL DAILY
Qty: 90 CAPSULE | Refills: 1 | Status: SHIPPED | OUTPATIENT
Start: 2022-01-31 | End: 2022-11-28

## 2022-01-31 RX ORDER — LOSARTAN POTASSIUM 100 MG/1
100 TABLET ORAL DAILY
Qty: 90 TABLET | Refills: 1 | Status: SHIPPED | OUTPATIENT
Start: 2022-01-31 | End: 2022-09-09

## 2022-01-31 NOTE — TELEPHONE ENCOUNTER
flomax  Cozaar  proscar    Sending remaining refills to pharmacy change requested  Heide Almanza RN

## 2022-02-12 ENCOUNTER — HEALTH MAINTENANCE LETTER (OUTPATIENT)
Age: 72
End: 2022-02-12

## 2022-06-04 ENCOUNTER — HEALTH MAINTENANCE LETTER (OUTPATIENT)
Age: 72
End: 2022-06-04

## 2022-09-09 DIAGNOSIS — I10 ESSENTIAL HYPERTENSION: ICD-10-CM

## 2022-09-09 RX ORDER — LOSARTAN POTASSIUM 100 MG/1
TABLET ORAL
Qty: 90 TABLET | Refills: 1 | Status: SHIPPED | OUTPATIENT
Start: 2022-09-09

## 2022-09-09 NOTE — TELEPHONE ENCOUNTER
Pending Prescriptions:                       Disp   Refills    losartan (COZAAR) 100 MG tablet [Pharmacy *90 tab*1        Sig: TAKE 1 TABLET BY MOUTH EVERY DAY      Routing refill request to provider for review/approval because:  Labs not current:    Creatinine   Date Value Ref Range Status   10/07/2021 1.39 (H) 0.66 - 1.25 mg/dL Final   11/06/2020 1.29 (H) 0.66 - 1.25 mg/dL Final         Charla Gould RN

## 2022-10-09 ENCOUNTER — HEALTH MAINTENANCE LETTER (OUTPATIENT)
Age: 72
End: 2022-10-09

## 2022-11-26 ENCOUNTER — HEALTH MAINTENANCE LETTER (OUTPATIENT)
Age: 72
End: 2022-11-26

## 2022-11-26 ENCOUNTER — MYC MEDICAL ADVICE (OUTPATIENT)
Dept: INTERNAL MEDICINE | Facility: CLINIC | Age: 72
End: 2022-11-26

## 2022-11-26 DIAGNOSIS — N40.1 BENIGN PROSTATIC HYPERPLASIA WITH URINARY HESITANCY: ICD-10-CM

## 2022-11-26 DIAGNOSIS — R39.11 BENIGN PROSTATIC HYPERPLASIA WITH URINARY HESITANCY: ICD-10-CM

## 2022-11-28 RX ORDER — TAMSULOSIN HYDROCHLORIDE 0.4 MG/1
0.4 CAPSULE ORAL DAILY
Qty: 90 CAPSULE | Refills: 0 | Status: SHIPPED | OUTPATIENT
Start: 2022-11-28

## 2022-11-28 RX ORDER — FINASTERIDE 5 MG/1
5 TABLET, FILM COATED ORAL DAILY
Qty: 90 TABLET | Refills: 0 | Status: SHIPPED | OUTPATIENT
Start: 2022-11-28

## 2022-11-28 NOTE — TELEPHONE ENCOUNTER
Please see attached My Chart message.  Routing refill request to provider for review/approval because:  A break in medication: Both medications refilled 1/31/2022 for 90 day supply and 1 refill.    Heide Almanza RN

## 2023-02-18 ENCOUNTER — HEALTH MAINTENANCE LETTER (OUTPATIENT)
Age: 73
End: 2023-02-18

## 2023-05-21 ENCOUNTER — HEALTH MAINTENANCE LETTER (OUTPATIENT)
Age: 73
End: 2023-05-21

## 2023-07-11 ENCOUNTER — TRANSFERRED RECORDS (OUTPATIENT)
Dept: HEALTH INFORMATION MANAGEMENT | Facility: CLINIC | Age: 73
End: 2023-07-11
Payer: COMMERCIAL

## 2023-07-11 LAB — RETINOPATHY: NEGATIVE

## 2023-10-28 ENCOUNTER — HEALTH MAINTENANCE LETTER (OUTPATIENT)
Age: 73
End: 2023-10-28

## 2024-01-06 ENCOUNTER — HEALTH MAINTENANCE LETTER (OUTPATIENT)
Age: 74
End: 2024-01-06

## 2024-03-16 ENCOUNTER — HEALTH MAINTENANCE LETTER (OUTPATIENT)
Age: 74
End: 2024-03-16

## 2024-08-03 ENCOUNTER — HEALTH MAINTENANCE LETTER (OUTPATIENT)
Age: 74
End: 2024-08-03